# Patient Record
Sex: FEMALE | Race: WHITE | NOT HISPANIC OR LATINO | URBAN - METROPOLITAN AREA
[De-identification: names, ages, dates, MRNs, and addresses within clinical notes are randomized per-mention and may not be internally consistent; named-entity substitution may affect disease eponyms.]

---

## 2017-08-18 ENCOUNTER — OUTPATIENT (OUTPATIENT)
Dept: OUTPATIENT SERVICES | Facility: HOSPITAL | Age: 62
LOS: 1 days | End: 2017-08-18
Payer: COMMERCIAL

## 2017-08-18 ENCOUNTER — TRANSCRIPTION ENCOUNTER (OUTPATIENT)
Age: 62
End: 2017-08-18

## 2017-08-18 DIAGNOSIS — M47.812 SPONDYLOSIS WITHOUT MYELOPATHY OR RADICULOPATHY, CERVICAL REGION: ICD-10-CM

## 2017-08-18 PROCEDURE — 64491 INJ PARAVERT F JNT C/T 2 LEV: CPT | Mod: 50

## 2017-08-18 PROCEDURE — 64490 INJ PARAVERT F JNT C/T 1 LEV: CPT | Mod: 50

## 2017-08-18 PROCEDURE — 64492 INJ PARAVERT F JNT C/T 3 LEV: CPT | Mod: 50

## 2017-08-18 PROCEDURE — 76000 FLUOROSCOPY <1 HR PHYS/QHP: CPT

## 2017-09-01 ENCOUNTER — TRANSCRIPTION ENCOUNTER (OUTPATIENT)
Age: 62
End: 2017-09-01

## 2017-09-01 ENCOUNTER — OUTPATIENT (OUTPATIENT)
Dept: OUTPATIENT SERVICES | Facility: HOSPITAL | Age: 62
LOS: 1 days | End: 2017-09-01
Payer: COMMERCIAL

## 2017-09-01 DIAGNOSIS — M47.816 SPONDYLOSIS WITHOUT MYELOPATHY OR RADICULOPATHY, LUMBAR REGION: ICD-10-CM

## 2017-09-26 ENCOUNTER — OUTPATIENT (OUTPATIENT)
Dept: OUTPATIENT SERVICES | Facility: HOSPITAL | Age: 62
LOS: 1 days | End: 2017-09-26
Payer: COMMERCIAL

## 2017-09-26 DIAGNOSIS — M47.816 SPONDYLOSIS WITHOUT MYELOPATHY OR RADICULOPATHY, LUMBAR REGION: ICD-10-CM

## 2017-09-26 PROCEDURE — 64495 INJ PARAVERT F JNT L/S 3 LEV: CPT | Mod: 50

## 2017-09-26 PROCEDURE — 76000 FLUOROSCOPY <1 HR PHYS/QHP: CPT

## 2017-09-26 PROCEDURE — 64493 INJ PARAVERT F JNT L/S 1 LEV: CPT | Mod: 50

## 2017-09-26 PROCEDURE — 64494 INJ PARAVERT F JNT L/S 2 LEV: CPT | Mod: 50

## 2017-09-27 ENCOUNTER — TRANSCRIPTION ENCOUNTER (OUTPATIENT)
Age: 62
End: 2017-09-27

## 2017-10-03 ENCOUNTER — TRANSCRIPTION ENCOUNTER (OUTPATIENT)
Age: 62
End: 2017-10-03

## 2017-10-03 ENCOUNTER — OUTPATIENT (OUTPATIENT)
Dept: OUTPATIENT SERVICES | Facility: HOSPITAL | Age: 62
LOS: 1 days | End: 2017-10-03
Payer: COMMERCIAL

## 2017-10-03 DIAGNOSIS — M47.812 SPONDYLOSIS WITHOUT MYELOPATHY OR RADICULOPATHY, CERVICAL REGION: ICD-10-CM

## 2017-10-03 PROCEDURE — 76000 FLUOROSCOPY <1 HR PHYS/QHP: CPT

## 2017-10-03 PROCEDURE — 64491 INJ PARAVERT F JNT C/T 2 LEV: CPT | Mod: 50

## 2017-10-03 PROCEDURE — 64492 INJ PARAVERT F JNT C/T 3 LEV: CPT | Mod: 50

## 2017-10-03 PROCEDURE — 64490 INJ PARAVERT F JNT C/T 1 LEV: CPT | Mod: 50

## 2017-12-12 ENCOUNTER — INPATIENT (INPATIENT)
Facility: HOSPITAL | Age: 62
LOS: 1 days | Discharge: HOME | DRG: 494 | End: 2017-12-14
Attending: ORTHOPAEDIC SURGERY | Admitting: ORTHOPAEDIC SURGERY
Payer: COMMERCIAL

## 2017-12-12 VITALS
RESPIRATION RATE: 20 BRPM | TEMPERATURE: 98 F | DIASTOLIC BLOOD PRESSURE: 101 MMHG | OXYGEN SATURATION: 95 % | HEIGHT: 59 IN | WEIGHT: 134.92 LBS | HEART RATE: 115 BPM | SYSTOLIC BLOOD PRESSURE: 179 MMHG

## 2017-12-12 DIAGNOSIS — I10 ESSENTIAL (PRIMARY) HYPERTENSION: ICD-10-CM

## 2017-12-12 DIAGNOSIS — S82.842A DISPLACED BIMALLEOLAR FRACTURE OF LEFT LOWER LEG, INITIAL ENCOUNTER FOR CLOSED FRACTURE: ICD-10-CM

## 2017-12-12 DIAGNOSIS — E66.3 OVERWEIGHT: ICD-10-CM

## 2017-12-12 DIAGNOSIS — F17.210 NICOTINE DEPENDENCE, CIGARETTES, UNCOMPLICATED: ICD-10-CM

## 2017-12-12 DIAGNOSIS — E03.9 HYPOTHYROIDISM, UNSPECIFIED: ICD-10-CM

## 2017-12-12 DIAGNOSIS — Z01.818 ENCOUNTER FOR OTHER PREPROCEDURAL EXAMINATION: ICD-10-CM

## 2017-12-12 DIAGNOSIS — E78.5 HYPERLIPIDEMIA, UNSPECIFIED: ICD-10-CM

## 2017-12-12 DIAGNOSIS — Z29.9 ENCOUNTER FOR PROPHYLACTIC MEASURES, UNSPECIFIED: ICD-10-CM

## 2017-12-12 LAB
ALBUMIN SERPL ELPH-MCNC: 3.9 G/DL — SIGNIFICANT CHANGE UP (ref 3.3–5.2)
ALP SERPL-CCNC: 62 U/L — SIGNIFICANT CHANGE UP (ref 40–120)
ALT FLD-CCNC: 21 U/L — SIGNIFICANT CHANGE UP
ANION GAP SERPL CALC-SCNC: 12 MMOL/L — SIGNIFICANT CHANGE UP (ref 5–17)
AST SERPL-CCNC: 28 U/L — SIGNIFICANT CHANGE UP
BASOPHILS # BLD AUTO: 0.1 K/UL — SIGNIFICANT CHANGE UP (ref 0–0.2)
BASOPHILS NFR BLD AUTO: 0.7 % — SIGNIFICANT CHANGE UP (ref 0–2)
BILIRUB SERPL-MCNC: 0.4 MG/DL — SIGNIFICANT CHANGE UP (ref 0.4–2)
BLD GP AB SCN SERPL QL: SIGNIFICANT CHANGE UP
BUN SERPL-MCNC: 12 MG/DL — SIGNIFICANT CHANGE UP (ref 8–20)
CALCIUM SERPL-MCNC: 9.2 MG/DL — SIGNIFICANT CHANGE UP (ref 8.6–10.2)
CHLORIDE SERPL-SCNC: 104 MMOL/L — SIGNIFICANT CHANGE UP (ref 98–107)
CO2 SERPL-SCNC: 25 MMOL/L — SIGNIFICANT CHANGE UP (ref 22–29)
CREAT SERPL-MCNC: 0.66 MG/DL — SIGNIFICANT CHANGE UP (ref 0.5–1.3)
EOSINOPHIL # BLD AUTO: 0.2 K/UL — SIGNIFICANT CHANGE UP (ref 0–0.5)
EOSINOPHIL NFR BLD AUTO: 2.8 % — SIGNIFICANT CHANGE UP (ref 0–6)
GLUCOSE SERPL-MCNC: 98 MG/DL — SIGNIFICANT CHANGE UP (ref 70–115)
HCT VFR BLD CALC: 43.1 % — SIGNIFICANT CHANGE UP (ref 37–47)
HGB BLD-MCNC: 14.6 G/DL — SIGNIFICANT CHANGE UP (ref 12–16)
INR BLD: 1.07 RATIO — SIGNIFICANT CHANGE UP (ref 0.88–1.16)
LYMPHOCYTES # BLD AUTO: 3.8 K/UL — SIGNIFICANT CHANGE UP (ref 1–4.8)
LYMPHOCYTES # BLD AUTO: 44.9 % — SIGNIFICANT CHANGE UP (ref 20–55)
MCHC RBC-ENTMCNC: 29.4 PG — SIGNIFICANT CHANGE UP (ref 27–31)
MCHC RBC-ENTMCNC: 33.9 G/DL — SIGNIFICANT CHANGE UP (ref 32–36)
MCV RBC AUTO: 86.9 FL — SIGNIFICANT CHANGE UP (ref 81–99)
MONOCYTES # BLD AUTO: 0.6 K/UL — SIGNIFICANT CHANGE UP (ref 0–0.8)
MONOCYTES NFR BLD AUTO: 7.7 % — SIGNIFICANT CHANGE UP (ref 3–10)
NEUTROPHILS # BLD AUTO: 3.7 K/UL — SIGNIFICANT CHANGE UP (ref 1.8–8)
NEUTROPHILS NFR BLD AUTO: 43.7 % — SIGNIFICANT CHANGE UP (ref 37–73)
PLATELET # BLD AUTO: 272 K/UL — SIGNIFICANT CHANGE UP (ref 150–400)
POTASSIUM SERPL-MCNC: 4.7 MMOL/L — SIGNIFICANT CHANGE UP (ref 3.5–5.3)
POTASSIUM SERPL-SCNC: 4.7 MMOL/L — SIGNIFICANT CHANGE UP (ref 3.5–5.3)
PROT SERPL-MCNC: 7.1 G/DL — SIGNIFICANT CHANGE UP (ref 6.6–8.7)
PROTHROM AB SERPL-ACNC: 11.8 SEC — SIGNIFICANT CHANGE UP (ref 9.8–12.7)
RBC # BLD: 4.96 M/UL — SIGNIFICANT CHANGE UP (ref 4.4–5.2)
RBC # FLD: 13.9 % — SIGNIFICANT CHANGE UP (ref 11–15.6)
SODIUM SERPL-SCNC: 141 MMOL/L — SIGNIFICANT CHANGE UP (ref 135–145)
T4 AB SER-ACNC: 11 UG/DL — SIGNIFICANT CHANGE UP (ref 4.5–12)
TSH SERPL-MCNC: 0.63 UIU/ML — SIGNIFICANT CHANGE UP (ref 0.27–4.2)
TYPE + AB SCN PNL BLD: SIGNIFICANT CHANGE UP
WBC # BLD: 8.5 K/UL — SIGNIFICANT CHANGE UP (ref 4.8–10.8)
WBC # FLD AUTO: 8.5 K/UL — SIGNIFICANT CHANGE UP (ref 4.8–10.8)

## 2017-12-12 PROCEDURE — 73590 X-RAY EXAM OF LOWER LEG: CPT | Mod: 26,LT

## 2017-12-12 PROCEDURE — 93010 ELECTROCARDIOGRAM REPORT: CPT

## 2017-12-12 PROCEDURE — 99223 1ST HOSP IP/OBS HIGH 75: CPT | Mod: 57

## 2017-12-12 PROCEDURE — 27822 TREATMENT OF ANKLE FRACTURE: CPT | Mod: AS,LT

## 2017-12-12 PROCEDURE — 99223 1ST HOSP IP/OBS HIGH 75: CPT | Mod: 25

## 2017-12-12 PROCEDURE — 73610 X-RAY EXAM OF ANKLE: CPT | Mod: 26,76,LT

## 2017-12-12 PROCEDURE — 12345: CPT | Mod: NC

## 2017-12-12 PROCEDURE — 99406 BEHAV CHNG SMOKING 3-10 MIN: CPT

## 2017-12-12 PROCEDURE — 73610 X-RAY EXAM OF ANKLE: CPT | Mod: 26,77,LT

## 2017-12-12 PROCEDURE — 27822 TREATMENT OF ANKLE FRACTURE: CPT | Mod: LT

## 2017-12-12 PROCEDURE — 76000 FLUOROSCOPY <1 HR PHYS/QHP: CPT | Mod: 26

## 2017-12-12 PROCEDURE — 99285 EMERGENCY DEPT VISIT HI MDM: CPT | Mod: 25

## 2017-12-12 PROCEDURE — 71010: CPT | Mod: 26

## 2017-12-12 RX ORDER — SODIUM CHLORIDE 9 MG/ML
1000 INJECTION INTRAMUSCULAR; INTRAVENOUS; SUBCUTANEOUS
Qty: 0 | Refills: 0 | Status: DISCONTINUED | OUTPATIENT
Start: 2017-12-12 | End: 2017-12-12

## 2017-12-12 RX ORDER — HYDROMORPHONE HYDROCHLORIDE 2 MG/ML
0.5 INJECTION INTRAMUSCULAR; INTRAVENOUS; SUBCUTANEOUS EVERY 6 HOURS
Qty: 0 | Refills: 0 | Status: DISCONTINUED | OUTPATIENT
Start: 2017-12-12 | End: 2017-12-13

## 2017-12-12 RX ORDER — LEVOTHYROXINE SODIUM 125 MCG
100 TABLET ORAL DAILY
Qty: 0 | Refills: 0 | Status: DISCONTINUED | OUTPATIENT
Start: 2017-12-12 | End: 2017-12-12

## 2017-12-12 RX ORDER — LISINOPRIL 2.5 MG/1
10 TABLET ORAL DAILY
Qty: 0 | Refills: 0 | Status: DISCONTINUED | OUTPATIENT
Start: 2017-12-12 | End: 2017-12-12

## 2017-12-12 RX ORDER — SODIUM CHLORIDE 9 MG/ML
1000 INJECTION, SOLUTION INTRAVENOUS
Qty: 0 | Refills: 0 | Status: DISCONTINUED | OUTPATIENT
Start: 2017-12-12 | End: 2017-12-13

## 2017-12-12 RX ORDER — SENNA PLUS 8.6 MG/1
2 TABLET ORAL AT BEDTIME
Qty: 0 | Refills: 0 | Status: DISCONTINUED | OUTPATIENT
Start: 2017-12-12 | End: 2017-12-12

## 2017-12-12 RX ORDER — LEVOTHYROXINE SODIUM 125 MCG
112 TABLET ORAL DAILY
Qty: 0 | Refills: 0 | Status: DISCONTINUED | OUTPATIENT
Start: 2017-12-12 | End: 2017-12-14

## 2017-12-12 RX ORDER — LORATADINE 10 MG/1
10 TABLET ORAL DAILY
Qty: 0 | Refills: 0 | Status: DISCONTINUED | OUTPATIENT
Start: 2017-12-12 | End: 2017-12-12

## 2017-12-12 RX ORDER — ATORVASTATIN CALCIUM 80 MG/1
1 TABLET, FILM COATED ORAL
Qty: 0 | Refills: 0 | COMMUNITY

## 2017-12-12 RX ORDER — CEFAZOLIN SODIUM 1 G
2000 VIAL (EA) INJECTION ONCE
Qty: 0 | Refills: 0 | Status: COMPLETED | OUTPATIENT
Start: 2017-12-12 | End: 2017-12-12

## 2017-12-12 RX ORDER — LEVOTHYROXINE SODIUM 125 MCG
1 TABLET ORAL
Qty: 0 | Refills: 0 | COMMUNITY

## 2017-12-12 RX ORDER — HYDROMORPHONE HYDROCHLORIDE 2 MG/ML
0.5 INJECTION INTRAMUSCULAR; INTRAVENOUS; SUBCUTANEOUS
Qty: 0 | Refills: 0 | Status: DISCONTINUED | OUTPATIENT
Start: 2017-12-12 | End: 2017-12-12

## 2017-12-12 RX ORDER — SODIUM CHLORIDE 9 MG/ML
1000 INJECTION, SOLUTION INTRAVENOUS
Qty: 0 | Refills: 0 | Status: DISCONTINUED | OUTPATIENT
Start: 2017-12-12 | End: 2017-12-12

## 2017-12-12 RX ORDER — ONDANSETRON 8 MG/1
4 TABLET, FILM COATED ORAL ONCE
Qty: 0 | Refills: 0 | Status: DISCONTINUED | OUTPATIENT
Start: 2017-12-12 | End: 2017-12-12

## 2017-12-12 RX ORDER — CYCLOBENZAPRINE HYDROCHLORIDE 10 MG/1
10 TABLET, FILM COATED ORAL THREE TIMES A DAY
Qty: 0 | Refills: 0 | Status: DISCONTINUED | OUTPATIENT
Start: 2017-12-12 | End: 2017-12-14

## 2017-12-12 RX ORDER — HYDROMORPHONE HYDROCHLORIDE 2 MG/ML
1 INJECTION INTRAMUSCULAR; INTRAVENOUS; SUBCUTANEOUS
Qty: 0 | Refills: 0 | Status: DISCONTINUED | OUTPATIENT
Start: 2017-12-12 | End: 2017-12-12

## 2017-12-12 RX ORDER — OXYCODONE HYDROCHLORIDE 5 MG/1
1 TABLET ORAL
Qty: 0 | Refills: 0 | COMMUNITY

## 2017-12-12 RX ORDER — LISINOPRIL 2.5 MG/1
1 TABLET ORAL
Qty: 0 | Refills: 0 | COMMUNITY

## 2017-12-12 RX ORDER — AMITRIPTYLINE HCL 25 MG
150 TABLET ORAL AT BEDTIME
Qty: 0 | Refills: 0 | Status: DISCONTINUED | OUTPATIENT
Start: 2017-12-12 | End: 2017-12-14

## 2017-12-12 RX ORDER — FENTANYL CITRATE 50 UG/ML
50 INJECTION INTRAVENOUS
Qty: 0 | Refills: 0 | Status: DISCONTINUED | OUTPATIENT
Start: 2017-12-12 | End: 2017-12-12

## 2017-12-12 RX ORDER — DESLORATADINE 5 MG/1
1 TABLET, FILM COATED ORAL
Qty: 0 | Refills: 0 | COMMUNITY

## 2017-12-12 RX ORDER — ATORVASTATIN CALCIUM 80 MG/1
40 TABLET, FILM COATED ORAL AT BEDTIME
Qty: 0 | Refills: 0 | Status: DISCONTINUED | OUTPATIENT
Start: 2017-12-12 | End: 2017-12-12

## 2017-12-12 RX ORDER — CYCLOBENZAPRINE HYDROCHLORIDE 10 MG/1
1 TABLET, FILM COATED ORAL
Qty: 0 | Refills: 0 | COMMUNITY

## 2017-12-12 RX ORDER — ENOXAPARIN SODIUM 100 MG/ML
40 INJECTION SUBCUTANEOUS EVERY 24 HOURS
Qty: 0 | Refills: 0 | Status: DISCONTINUED | OUTPATIENT
Start: 2017-12-13 | End: 2017-12-14

## 2017-12-12 RX ORDER — HYDROMORPHONE HYDROCHLORIDE 2 MG/ML
2 INJECTION INTRAMUSCULAR; INTRAVENOUS; SUBCUTANEOUS EVERY 4 HOURS
Qty: 0 | Refills: 0 | Status: DISCONTINUED | OUTPATIENT
Start: 2017-12-12 | End: 2017-12-14

## 2017-12-12 RX ORDER — LIOTHYRONINE SODIUM 25 UG/1
5 TABLET ORAL DAILY
Qty: 0 | Refills: 0 | Status: DISCONTINUED | OUTPATIENT
Start: 2017-12-12 | End: 2017-12-14

## 2017-12-12 RX ORDER — LIOTHYRONINE SODIUM 25 UG/1
5 TABLET ORAL DAILY
Qty: 0 | Refills: 0 | Status: DISCONTINUED | OUTPATIENT
Start: 2017-12-12 | End: 2017-12-12

## 2017-12-12 RX ORDER — AMITRIPTYLINE HCL 25 MG
150 TABLET ORAL AT BEDTIME
Qty: 0 | Refills: 0 | Status: DISCONTINUED | OUTPATIENT
Start: 2017-12-12 | End: 2017-12-12

## 2017-12-12 RX ORDER — AMITRIPTYLINE HCL 25 MG
1 TABLET ORAL
Qty: 0 | Refills: 0 | COMMUNITY

## 2017-12-12 RX ORDER — HYDROMORPHONE HYDROCHLORIDE 2 MG/ML
1 INJECTION INTRAMUSCULAR; INTRAVENOUS; SUBCUTANEOUS ONCE
Qty: 0 | Refills: 0 | Status: DISCONTINUED | OUTPATIENT
Start: 2017-12-12 | End: 2017-12-12

## 2017-12-12 RX ORDER — ATORVASTATIN CALCIUM 80 MG/1
40 TABLET, FILM COATED ORAL AT BEDTIME
Qty: 0 | Refills: 0 | Status: DISCONTINUED | OUTPATIENT
Start: 2017-12-12 | End: 2017-12-14

## 2017-12-12 RX ORDER — ACETAMINOPHEN 500 MG
650 TABLET ORAL EVERY 6 HOURS
Qty: 0 | Refills: 0 | Status: DISCONTINUED | OUTPATIENT
Start: 2017-12-12 | End: 2017-12-13

## 2017-12-12 RX ORDER — INFLUENZA VIRUS VACCINE 15; 15; 15; 15 UG/.5ML; UG/.5ML; UG/.5ML; UG/.5ML
0.5 SUSPENSION INTRAMUSCULAR ONCE
Qty: 0 | Refills: 0 | Status: COMPLETED | OUTPATIENT
Start: 2017-12-12 | End: 2017-12-12

## 2017-12-12 RX ORDER — HYDRALAZINE HCL 50 MG
10 TABLET ORAL EVERY 6 HOURS
Qty: 0 | Refills: 0 | Status: DISCONTINUED | OUTPATIENT
Start: 2017-12-12 | End: 2017-12-12

## 2017-12-12 RX ORDER — MAGNESIUM HYDROXIDE 400 MG/1
30 TABLET, CHEWABLE ORAL DAILY
Qty: 0 | Refills: 0 | Status: DISCONTINUED | OUTPATIENT
Start: 2017-12-12 | End: 2017-12-14

## 2017-12-12 RX ORDER — HYDROMORPHONE HYDROCHLORIDE 2 MG/ML
0.5 INJECTION INTRAMUSCULAR; INTRAVENOUS; SUBCUTANEOUS
Qty: 0 | Refills: 0 | Status: DISCONTINUED | OUTPATIENT
Start: 2017-12-12 | End: 2017-12-13

## 2017-12-12 RX ORDER — OXYCODONE HYDROCHLORIDE 5 MG/1
10 TABLET ORAL EVERY 4 HOURS
Qty: 0 | Refills: 0 | Status: DISCONTINUED | OUTPATIENT
Start: 2017-12-12 | End: 2017-12-13

## 2017-12-12 RX ORDER — LORATADINE 10 MG/1
10 TABLET ORAL DAILY
Qty: 0 | Refills: 0 | Status: DISCONTINUED | OUTPATIENT
Start: 2017-12-12 | End: 2017-12-14

## 2017-12-12 RX ORDER — MORPHINE SULFATE 50 MG/1
4 CAPSULE, EXTENDED RELEASE ORAL
Qty: 0 | Refills: 0 | Status: DISCONTINUED | OUTPATIENT
Start: 2017-12-12 | End: 2017-12-12

## 2017-12-12 RX ORDER — ONDANSETRON 8 MG/1
4 TABLET, FILM COATED ORAL EVERY 6 HOURS
Qty: 0 | Refills: 0 | Status: DISCONTINUED | OUTPATIENT
Start: 2017-12-12 | End: 2017-12-14

## 2017-12-12 RX ORDER — DOCUSATE SODIUM 100 MG
100 CAPSULE ORAL THREE TIMES A DAY
Qty: 0 | Refills: 0 | Status: DISCONTINUED | OUTPATIENT
Start: 2017-12-12 | End: 2017-12-14

## 2017-12-12 RX ORDER — LISINOPRIL 2.5 MG/1
10 TABLET ORAL DAILY
Qty: 0 | Refills: 0 | Status: DISCONTINUED | OUTPATIENT
Start: 2017-12-12 | End: 2017-12-14

## 2017-12-12 RX ORDER — IPRATROPIUM/ALBUTEROL SULFATE 18-103MCG
3 AEROSOL WITH ADAPTER (GRAM) INHALATION ONCE
Qty: 0 | Refills: 0 | Status: DISCONTINUED | OUTPATIENT
Start: 2017-12-12 | End: 2017-12-12

## 2017-12-12 RX ORDER — HYDROMORPHONE HYDROCHLORIDE 2 MG/ML
0.5 INJECTION INTRAMUSCULAR; INTRAVENOUS; SUBCUTANEOUS EVERY 6 HOURS
Qty: 0 | Refills: 0 | Status: DISCONTINUED | OUTPATIENT
Start: 2017-12-12 | End: 2017-12-12

## 2017-12-12 RX ORDER — LIOTHYRONINE SODIUM 25 UG/1
1 TABLET ORAL
Qty: 0 | Refills: 0 | COMMUNITY

## 2017-12-12 RX ORDER — OXYCODONE HYDROCHLORIDE 5 MG/1
5 TABLET ORAL EVERY 4 HOURS
Qty: 0 | Refills: 0 | Status: DISCONTINUED | OUTPATIENT
Start: 2017-12-12 | End: 2017-12-13

## 2017-12-12 RX ORDER — CEFAZOLIN SODIUM 1 G
2000 VIAL (EA) INJECTION ONCE
Qty: 0 | Refills: 0 | Status: DISCONTINUED | OUTPATIENT
Start: 2017-12-12 | End: 2017-12-12

## 2017-12-12 RX ORDER — DOCUSATE SODIUM 100 MG
100 CAPSULE ORAL THREE TIMES A DAY
Qty: 0 | Refills: 0 | Status: DISCONTINUED | OUTPATIENT
Start: 2017-12-12 | End: 2017-12-12

## 2017-12-12 RX ADMIN — FENTANYL CITRATE 50 MICROGRAM(S): 50 INJECTION INTRAVENOUS at 18:15

## 2017-12-12 RX ADMIN — HYDROMORPHONE HYDROCHLORIDE 1 MILLIGRAM(S): 2 INJECTION INTRAMUSCULAR; INTRAVENOUS; SUBCUTANEOUS at 03:06

## 2017-12-12 RX ADMIN — HYDROMORPHONE HYDROCHLORIDE 1 MILLIGRAM(S): 2 INJECTION INTRAMUSCULAR; INTRAVENOUS; SUBCUTANEOUS at 03:21

## 2017-12-12 RX ADMIN — FENTANYL CITRATE 50 MICROGRAM(S): 50 INJECTION INTRAVENOUS at 18:18

## 2017-12-12 RX ADMIN — Medication 100 MILLIGRAM(S): at 23:49

## 2017-12-12 RX ADMIN — FENTANYL CITRATE 50 MICROGRAM(S): 50 INJECTION INTRAVENOUS at 18:57

## 2017-12-12 RX ADMIN — HYDROMORPHONE HYDROCHLORIDE 0.5 MILLIGRAM(S): 2 INJECTION INTRAMUSCULAR; INTRAVENOUS; SUBCUTANEOUS at 20:26

## 2017-12-12 RX ADMIN — MORPHINE SULFATE 4 MILLIGRAM(S): 50 CAPSULE, EXTENDED RELEASE ORAL at 08:55

## 2017-12-12 RX ADMIN — ATORVASTATIN CALCIUM 40 MILLIGRAM(S): 80 TABLET, FILM COATED ORAL at 23:49

## 2017-12-12 RX ADMIN — FENTANYL CITRATE 50 MICROGRAM(S): 50 INJECTION INTRAVENOUS at 18:28

## 2017-12-12 RX ADMIN — MORPHINE SULFATE 4 MILLIGRAM(S): 50 CAPSULE, EXTENDED RELEASE ORAL at 12:20

## 2017-12-12 RX ADMIN — MORPHINE SULFATE 4 MILLIGRAM(S): 50 CAPSULE, EXTENDED RELEASE ORAL at 13:26

## 2017-12-12 RX ADMIN — CYCLOBENZAPRINE HYDROCHLORIDE 10 MILLIGRAM(S): 10 TABLET, FILM COATED ORAL at 23:49

## 2017-12-12 RX ADMIN — FENTANYL CITRATE 50 MICROGRAM(S): 50 INJECTION INTRAVENOUS at 18:05

## 2017-12-12 RX ADMIN — HYDROMORPHONE HYDROCHLORIDE 2 MILLIGRAM(S): 2 INJECTION INTRAMUSCULAR; INTRAVENOUS; SUBCUTANEOUS at 20:12

## 2017-12-12 RX ADMIN — HYDROMORPHONE HYDROCHLORIDE 0.5 MILLIGRAM(S): 2 INJECTION INTRAMUSCULAR; INTRAVENOUS; SUBCUTANEOUS at 21:55

## 2017-12-12 RX ADMIN — HYDROMORPHONE HYDROCHLORIDE 1 MILLIGRAM(S): 2 INJECTION INTRAMUSCULAR; INTRAVENOUS; SUBCUTANEOUS at 05:05

## 2017-12-12 RX ADMIN — HYDROMORPHONE HYDROCHLORIDE 1 MILLIGRAM(S): 2 INJECTION INTRAMUSCULAR; INTRAVENOUS; SUBCUTANEOUS at 04:50

## 2017-12-12 RX ADMIN — FENTANYL CITRATE 50 MICROGRAM(S): 50 INJECTION INTRAVENOUS at 18:11

## 2017-12-12 RX ADMIN — Medication 650 MILLIGRAM(S): at 22:18

## 2017-12-12 RX ADMIN — HYDROMORPHONE HYDROCHLORIDE 2 MILLIGRAM(S): 2 INJECTION INTRAMUSCULAR; INTRAVENOUS; SUBCUTANEOUS at 19:33

## 2017-12-12 RX ADMIN — MORPHINE SULFATE 4 MILLIGRAM(S): 50 CAPSULE, EXTENDED RELEASE ORAL at 09:23

## 2017-12-12 RX ADMIN — HYDROMORPHONE HYDROCHLORIDE 0.5 MILLIGRAM(S): 2 INJECTION INTRAMUSCULAR; INTRAVENOUS; SUBCUTANEOUS at 21:31

## 2017-12-12 RX ADMIN — OXYCODONE HYDROCHLORIDE 10 MILLIGRAM(S): 5 TABLET ORAL at 22:19

## 2017-12-12 RX ADMIN — SODIUM CHLORIDE 125 MILLILITER(S): 9 INJECTION INTRAMUSCULAR; INTRAVENOUS; SUBCUTANEOUS at 08:55

## 2017-12-12 RX ADMIN — Medication 100 MILLIGRAM(S): at 19:31

## 2017-12-12 NOTE — BRIEF OPERATIVE NOTE - PRE-OP DX
Closed fracture of left ankle, initial encounter  12/12/2017  trimal ankle fracture/dislocation  Active  Satish Barone

## 2017-12-12 NOTE — H&P ADULT - PROBLEM SELECTOR PLAN 1
1. Admit to orthopedics   2. NPO for OR  3. Pain Control  4. NWB Left leg   5. Medical clearance for OR

## 2017-12-12 NOTE — H&P ADULT - NSHPREVIEWOFSYSTEMS_GEN_ALL_CORE
General No fevers, no chills  Resp No labored breathing   Cardiac no chest pain   ABD soft NT  Musculoskeletal Positive ankle deformity   Neuro no numbness, no weakness  Skin no rash

## 2017-12-12 NOTE — BRIEF OPERATIVE NOTE - PROCEDURE
<<-----Click on this checkbox to enter Procedure Ankle fracture surgery  12/12/2017  ORIF left trimal ankle fracture. Synthes posterolateral 4 hole fibula plate with 4 distal and 4 proximal screws, one medial mal screw  Active  MLINN

## 2017-12-12 NOTE — CONSULT NOTE ADULT - PROBLEM SELECTOR RECOMMENDATION 8
DVT ppx w/ ICD's recommended. will need lovenox post-op. I discussed early ambulation in post-operative period and discussed importance of ambulation in recovery period to minimize risk of VTE

## 2017-12-12 NOTE — ED PROVIDER NOTE - OBJECTIVE STATEMENT
This is a 61 y/o F presents to ED c/o L ankle pain with obvious deformity. Pt reports she was on her deck, a cat jumped out and as she attempted to not step on it she twisted wrong, hurt her ankle and fell. On 20mg morphine QID for chronic pain. Allergic to ibuprofen. Denies head trauma, LOC, nausea, vomiting, fever, abd pain, numbness, tingling or any other complaints at this time.  PCP: Dr. Lombardi

## 2017-12-12 NOTE — CONSULT NOTE ADULT - ASSESSMENT
63yo F w/ PMHx chronic low back pain, chronic pain syndrome, hypothyroidism, HTN and current smoker who presents s/p mechanical fall and sustained L bimalleolar ankle fracture. Orthopedics requested medical consult for jorge-operative risk assessment.

## 2017-12-12 NOTE — ED ADULT NURSE NOTE - OBJECTIVE STATEMENT
Pt received in [facility] s/p slip and fall over mat on deck after being spooked by a cat. Pt rolled her left ankle and presents today with obvious deformity to b/l ankle with beginning stages of ecchymosis. +pedal pulses b/l. Pt with hx of HTN and hashimoto's.

## 2017-12-12 NOTE — CONSULT NOTE ADULT - PROBLEM SELECTOR RECOMMENDATION 3
labs indicate pt's dosing may be elevated  mildly reduce synthroid dose today, recommend f/u endocrinologist as outpatient for adjustment of thyroid supplementing therapy  pt has h/o Hashimoto's and was hyperthyroid initially (>25yrs ago) but for 15 years has been taking the regimen listed

## 2017-12-12 NOTE — H&P ADULT - NSHPPHYSICALEXAM_GEN_ALL_CORE
General WD, WN female in NAD  ENT Mucus Membranes Moist   Neck FROM  Resp no labored breathing   Left ankle positive deformity, positive tenderness throughout ankle, no tenderness to proximal leg or foot, sensation intact to touch, FROM to toes   Skin No visible rashes

## 2017-12-12 NOTE — CONSULT NOTE ADULT - PROBLEM SELECTOR RECOMMENDATION 9
admitted to ortho service  pain control per primary team  surgical plan per primary team  DVT prophylaxis once surgery team agrees in post-op period

## 2017-12-12 NOTE — ED PROVIDER NOTE - PROGRESS NOTE DETAILS
Spoke to ortho about the pt's distal Tib/fib fx.  I Theresa Pretty attest that this documentation has been prepared under the direction and in the presence of Mitul Estrada MD. Patient to be admitted to Dr. Pablo service and medicine to consult

## 2017-12-12 NOTE — H&P ADULT - ASSESSMENT
FRACTURE REDUCTION  PROCEDURE NOTE: Fracture reduction     Performed by:  Jaxson Ware PA-C    Indication: Acute fracture with displacement, requiring fracture reduction.    Consent: The risks and benefits of the procedure including incomplete reduction, nerve damage and bleeding were explained and the patient verbalized their understanding and wished to proceed with the procedure. Written consent was obtained following the discussion.    Universal Protocol: a time out was performed and the correct patient and site were verified     Procedure: Neurovascular exam intact prior to fracture reduction.  Skin exam : No bleeding or lacerations at the fracture site. Reduction of the Left ankle was accomplished via axial traction and careful manipulation. Following adequate reduction and alignment of the fractured bone, the fracture was immobilized with a  plaster U and posterior splint. Distally, the extremity was neurovascular intact following the procedure.  The patient tolerated the procedure well.    Post reduction films obtained and demonstrated an adequate reduction.    Complications: None

## 2017-12-12 NOTE — PROGRESS NOTE ADULT - SUBJECTIVE AND OBJECTIVE BOX
Patient is a 62y old  Female who presents with a chief complaint of Left ankle Bimall fx    HPI:  Patient is a 62 year old female who presented to [facility] ER c/o Left ankle deformity and pain s/p fall. patient states at approx 1 am she tripped on deck after being frightened by a cat causing injury to ankle.  Patient was seen by ER staff and xrays revealed positive left ankle bimall fx with dislocation of joint.  Orthopedic consult was called and patient had closed reduction performed in ER with splint application.  Patient was  admitted for possible  OR after medical optimization       Allergies    Lyrica (Hives; Chills)  Motrin (Rash; Urticaria; Hives)  NSAIDs (Rash; Urticaria; Hives)    Intolerances        REVIEW OF SYSTEMS:    ankle pain , no other complaints     Vital Signs Last 24 Hrs  T(C): 36.4 (12 Dec 2017 08:50), Max: 36.9 (12 Dec 2017 02:33)  T(F): 97.5 (12 Dec 2017 08:50), Max: 98.4 (12 Dec 2017 02:33)  HR: 85 (12 Dec 2017 08:50) (85 - 115)  BP: 145/78 (12 Dec 2017 08:50) (145/78 - 179/101)  BP(mean): --  RR: 20 (12 Dec 2017 02:33) (20 - 20)  SpO2: 95% (12 Dec 2017 02:33) (95% - 95%)    PHYSICAL EXAM:    GENERAL: NAD, well-groomed, well-developed  HEAD:  Atraumatic, Normocephalic  NECK: Supple, No JVD, Normal thyroid  CHEST/LUNG: Clear to percussion bilaterally; No rales, rhonchi, wheezing, or rubs  HEART: Regular rate and rhythm; No murmurs, rubs, or gallops  ABDOMEN: Soft, Nontender, Nondistended; Bowel sounds present  EXTREMITIES:  2+ Peripheral Pulses, No clubbing, cyanosis, or edema      LABS:                        14.6   8.5   )-----------( 272      ( 12 Dec 2017 03:26 )             43.1     12-12    141  |  104  |  12.0  ----------------------------<  98  4.7   |  25.0  |  0.66    Ca    9.2      12 Dec 2017 03:26    TPro  7.1  /  Alb  3.9  /  TBili  0.4  /  DBili  x   /  AST  28  /  ALT  21  /  AlkPhos  62  12-12    PT/INR - ( 12 Dec 2017 03:26 )   PT: 11.8 sec;   INR: 1.07 ratio               RADIOLOGY & ADDITIONAL TESTS:

## 2017-12-12 NOTE — H&P ADULT - HISTORY OF PRESENT ILLNESS
Patient is a 62 year old female who presented to [facility] ER c/o Left ankle deformity and pain s/p fall. patient states at approx 1 am she tripped on deck after being frightened by a cat causing injury to ankle.  Patient was seen by ER staff and xrays revealed positive left ankle bimall fx with dislocation of joint.  Orthopedic consult was called and patient had closed reduction performed in ER with splint application.  Patient to be admitted for OR after medical optimization

## 2017-12-12 NOTE — CONSULT NOTE ADULT - PROBLEM SELECTOR RECOMMENDATION 2
RCRI score of 0  pt had stress testing and cardiac cath 11/2015 and work up was unremarkable  cardiology: Dr. Grubbs  pt may proceed w/ surgical procedure w/ low risk for low to intermediate risk procedure

## 2017-12-12 NOTE — CONSULT NOTE ADULT - PROBLEM SELECTOR RECOMMENDATION 7
6 minutes spent discussing the risks associated w/ smoking including increased risk clinically significant cardiovascular disease, debility, death. pt and spouse agree w/ the conseling provided and demonstrated adequate understanding of counseling provided. nicotine patch offered and declined

## 2017-12-12 NOTE — SBIRT NOTE. - NSSBIRTSERVICES_GEN_A_ED_FT
Provided SBIRT services: Full screen Negative. Positive reinforcement provided given patient currently within healthy guidelines. Education materials reviewed and given to patient.  Audit Score: 0  DAST Score: 0  Duration = 5 Minutes

## 2017-12-12 NOTE — CONSULT NOTE ADULT - SUBJECTIVE AND OBJECTIVE BOX
PCP: Lombardi    C/c: L ankle pain s/p mechanical fall    63yo F w/ PMHx chronic low back pain, chronic pain syndrome, hypothyroidism, HTN and current smoker who presents s/p mechanical fall and sustained L bimalleolar ankle fracture. Orthopedics requested medical consult for jorge-operative risk assessment.     PMHx: chronic low back pain, chronic pain syndrome, hypothyroidism, HTN and current smoker  Allergies: lyrica, motrin, NSAID's  PSHx:     Home Medications:  amitriptyline 150 mg oral tablet: 1 tab(s) orally once a day (at bedtime) (12 Dec 2017 03:22)  atorvastatin 40 mg oral tablet: 1 tab(s) orally once a day (12 Dec 2017 03:22)  cyclobenzaprine 10 mg oral tablet: 1 tab(s) orally 3 times a day (12 Dec 2017 03:22)  desloratadine 5 mg oral tablet: 1 tab(s) orally once a day (12 Dec 2017 03:22)  levothyroxine 112 mcg (0.112 mg) oral tablet: 1 tab(s) orally once a day (12 Dec 2017 03:22)  liothyronine 5 mcg oral tablet: 1 tab(s) orally once a day (12 Dec 2017 03:22)  lisinopril 10 mg oral tablet: 1 tab(s) orally once a day (12 Dec 2017 03:22)  oxyCODONE 20 mg oral tablet: 1 tab(s) orally every 6 hours (12 Dec 2017 03:22)    Family Hx:   mother and father:    Social Hx:   current smoker  etoh:  recreational drug use:    ROS:   CONSTITUTIONAL: denies fever, chills, fatigue, weakness  HEENT: denies blurred vision, sore throat  SKIN: denies new lesions, rash  CARDIOVASCULAR: denies chest pain, chest pressure, palpitations  RESPIRATORY: denies shortness of breath, sputum production  GASTROINTESTINAL: denies nausea, vomiting, diarrhea, abdominal pain  GENITOURINARY: denies dysuria, discharge  NEUROLOGICAL: denies numbness, headache, focal weakness  MUSCULOSKELETAL: denies new joint pain, muscle aches  HEMATOLOGIC: denies gross bleeding, bruising  LYMPHATICS: denies enlarged lymph nodes, extremity swelling  PSYCHIATRIC: denies recent changes in anxiety, depression  ENDOCRINOLOGIC: denies sweating, cold or heat intolerance PCP: Lombardi    C/c: L ankle pain s/p mechanical fall    HPI: 61yo F w/ PMHx chronic low back pain, chronic pain syndrome, hypothyroidism, HTN and current smoker who presents s/p mechanical fall and sustained L bimalleolar ankle fracture. Orthopedics requested medical consult for jorge-operative risk assessment.     PMHx: chronic low back pain, chronic pain syndrome, hypothyroidism, HTN and current smoker  Allergies: lyrica, motrin, NSAID's  PSHx:     Home Medications:  amitriptyline 150 mg oral tablet: 1 tab(s) orally once a day (at bedtime) (12 Dec 2017 03:22)  atorvastatin 40 mg oral tablet: 1 tab(s) orally once a day (12 Dec 2017 03:22)  cyclobenzaprine 10 mg oral tablet: 1 tab(s) orally 3 times a day (12 Dec 2017 03:22)  desloratadine 5 mg oral tablet: 1 tab(s) orally once a day (12 Dec 2017 03:22)  levothyroxine 112 mcg (0.112 mg) oral tablet: 1 tab(s) orally once a day (12 Dec 2017 03:22)  liothyronine 5 mcg oral tablet: 1 tab(s) orally once a day (12 Dec 2017 03:22)  lisinopril 10 mg oral tablet: 1 tab(s) orally once a day (12 Dec 2017 03:22)  oxyCODONE 20 mg oral tablet: 1 tab(s) orally every 6 hours (12 Dec 2017 03:22)    Family Hx:   mother and father:    Social Hx:   current smoker  etoh:  recreational drug use:    ROS:   CONSTITUTIONAL: denies fever, chills, fatigue, weakness  HEENT: denies blurred vision, sore throat  SKIN: denies new lesions, rash  CARDIOVASCULAR: denies chest pain, chest pressure, palpitations  RESPIRATORY: denies shortness of breath, sputum production  GASTROINTESTINAL: denies nausea, vomiting, diarrhea, abdominal pain  GENITOURINARY: denies dysuria, discharge  NEUROLOGICAL: denies numbness, headache, focal weakness  MUSCULOSKELETAL: as per hpi  HEMATOLOGIC: denies gross bleeding, bruising  LYMPHATICS: denies enlarged lymph nodes, extremity swelling  PSYCHIATRIC: denies recent changes in anxiety, depression  ENDOCRINOLOGIC: denies sweating, cold or heat intolerance    Labs:                     14.6   8.5   )-----------( 272      ( 12 Dec 2017 03:26 )             43.1     12-12    141  |  104  |  12.0  ----------------------------<  98  4.7   |  25.0  |  0.66    Ca    9.2      12 Dec 2017 03:26    TPro  7.1  /  Alb  3.9  /  TBili  0.4  /  DBili  x   /  AST  28  /  ALT  21  /  AlkPhos  62  12-12    PT/INR - ( 12 Dec 2017 03:26 )   PT: 11.8 sec;   INR: 1.07 ratio      Vital Signs Last 24 Hrs  T(C): 36.9 (12 Dec 2017 02:33), Max: 36.9 (12 Dec 2017 02:33)  T(F): 98.4 (12 Dec 2017 02:33), Max: 98.4 (12 Dec 2017 02:33)  HR: 115 (12 Dec 2017 02:33) (115 - 115)  BP: 179/101 (12 Dec 2017 02:33) (179/101 - 179/101)  BP(mean): --  RR: 20 (12 Dec 2017 02:33) (20 - 20)  SpO2: 95% (12 Dec 2017 02:33) (95% - 95%)    Radiology:   CXR: awaiting image (ordered)  EKG: awaiting (ordered) PCP: Lombardi    C/c: L ankle pain s/p mechanical fall    HPI: 61yo F w/ PMHx chronic low back pain, chronic pain syndrome, hypothyroidism, HTN and current smoker who presents s/p mechanical fall and sustained L bimalleolar ankle fracture. Orthopedics requested medical consult for jorge-operative risk assessment. Pt states that she went outside and was started. She tried to jump to avoid a pet in the yard and slipped and suffered a deformity in the L ankle. She felt severe pain 10/10.     PMHx: chronic low back pain, chronic pain syndrome, hypothyroidism, HTN and current smoker    Allergies: lyrica (blurred vision, severe intolerance), motrin, NSAID's (rash)    PSHx: R elbow tendon injury and repair plus bone spur repair in the same elbow in     Home Medications:  amitriptyline 150 mg oral tablet: 1 tab(s) orally once a day (at bedtime) (12 Dec 2017 03:22)  atorvastatin 40 mg oral tablet: 1 tab(s) orally once a day (12 Dec 2017 03:22)  cyclobenzaprine 10 mg oral tablet: 1 tab(s) orally 3 times a day (12 Dec 2017 03:22)  desloratadine 5 mg oral tablet: 1 tab(s) orally once a day (12 Dec 2017 03:22)  levothyroxine 112 mcg (0.112 mg) oral tablet: 1 tab(s) orally once a day (12 Dec 2017 03:22)  liothyronine 5 mcg oral tablet: 1 tab(s) orally once a day (12 Dec 2017 03:22)  lisinopril 10 mg oral tablet: 1 tab(s) orally once a day (12 Dec 2017 03:22)  oxyCODONE 20 mg oral tablet: 1 tab(s) orally every 6 hours (12 Dec 2017 03:22)    Family Hx:   mother: HTN, CAD, DM2, breast cancer  age 75 - father:  at 65 w/ multiple different types of cancer    Social Hx:   current smoker, 1/2 ppd x 40 years  etoh denies  recreational drug use denies  retired in , worked as an     ROS:   CONSTITUTIONAL: denies fever, chills, fatigue, weakness  HEENT: denies blurred vision, sore throat  SKIN: denies new lesions, rash  CARDIOVASCULAR: denies chest pain, chest pressure, palpitations  RESPIRATORY: denies shortness of breath, sputum production  GASTROINTESTINAL: denies nausea, vomiting, diarrhea, abdominal pain  GENITOURINARY: denies dysuria, discharge  NEUROLOGICAL: denies numbness, headache, focal weakness  MUSCULOSKELETAL: as per hpi  HEMATOLOGIC: denies gross bleeding, bruising  LYMPHATICS: denies enlarged lymph nodes, extremity swelling  PSYCHIATRIC: denies recent changes in anxiety, depression  ENDOCRINOLOGIC: denies sweating, cold or heat intolerance    Labs:                     14.6   8.5   )-----------( 272      ( 12 Dec 2017 03:26 )             43.1         141  |  104  |  12.0  ----------------------------<  98  4.7   |  25.0  |  0.66    Ca    9.2      12 Dec 2017 03:26    TPro  7.1  /  Alb  3.9  /  TBili  0.4  /  DBili  x   /  AST  28  /  ALT  21  /  AlkPhos  62      PT/INR - ( 12 Dec 2017 03:26 )   PT: 11.8 sec;   INR: 1.07 ratio      Vital Signs Last 24 Hrs  T(C): 36.9 (12 Dec 2017 02:33), Max: 36.9 (12 Dec 2017 02:33)  T(F): 98.4 (12 Dec 2017 02:33), Max: 98.4 (12 Dec 2017 02:33)  HR: 115 (12 Dec 2017 02:33) (115 - 115)  BP: 179/101 (12 Dec 2017 02:33) (179/101 - 179/101)  BP(mean): --  RR: 20 (12 Dec 2017 02:33) (20 - 20)  SpO2: 95% (12 Dec 2017 02:33) (95% - 95%)    Radiology:   CXR: awaiting image (ordered)  EKG: awaiting (ordered) PCP: Lombardi    C/c: L ankle pain s/p mechanical fall    HPI: 61yo F w/ PMHx chronic low back pain, chronic pain syndrome, hypothyroidism, HTN and current smoker who presents s/p mechanical fall and sustained L bimalleolar ankle fracture. Orthopedics requested medical consult for jorge-operative risk assessment. Pt states that she went outside and was started. She tried to jump to avoid a pet in the yard and slipped and suffered a deformity in the L ankle. She felt severe pain 10/10. No other complaints. No lightheadedness prior to fall. Denies CP/SOB prior to fall.     PMHx: chronic low back pain, chronic pain syndrome, hypothyroidism, HTN and current smoker    Allergies: lyrica (blurred vision, severe intolerance), motrin, NSAID's (rash)    PSHx: R elbow tendon injury and repair plus bone spur repair in the same elbow in     Home Medications:  amitriptyline 150 mg oral tablet: 1 tab(s) orally once a day (at bedtime) (12 Dec 2017 03:22)  atorvastatin 40 mg oral tablet: 1 tab(s) orally once a day (12 Dec 2017 03:22)  cyclobenzaprine 10 mg oral tablet: 1 tab(s) orally 3 times a day (12 Dec 2017 03:22)  desloratadine 5 mg oral tablet: 1 tab(s) orally once a day (12 Dec 2017 03:22)  levothyroxine 112 mcg (0.112 mg) oral tablet: 1 tab(s) orally once a day (12 Dec 2017 03:22)  liothyronine 5 mcg oral tablet: 1 tab(s) orally once a day (12 Dec 2017 03:22)  lisinopril 10 mg oral tablet: 1 tab(s) orally once a day (12 Dec 2017 03:22)  oxyCODONE 20 mg oral tablet: 1 tab(s) orally every 6 hours (12 Dec 2017 03:22)    Family Hx:   mother: HTN, CAD, DM2, breast cancer  age 75 - father:  at 65 w/ multiple different types of cancer    Social Hx:   current smoker, 1/2 ppd x 40 years  etoh denies  recreational drug use denies  retired in , worked as an     ROS:   PRE-OP: >4 mets of exercise without dyspnea or SOB. no h/o anesthesia complications in past. no current or history of IVDA. current smoker.   CONSTITUTIONAL: denies fever, chills, fatigue, weakness  HEENT: denies blurred vision, sore throat  SKIN: denies new lesions, rash  CARDIOVASCULAR: denies chest pain, chest pressure, palpitations  RESPIRATORY: denies shortness of breath, sputum production  GASTROINTESTINAL: denies nausea, vomiting, diarrhea, abdominal pain  GENITOURINARY: denies dysuria, discharge  NEUROLOGICAL: denies numbness, headache, focal weakness  MUSCULOSKELETAL: as per hpi  HEMATOLOGIC: denies gross bleeding, bruising  LYMPHATICS: denies enlarged lymph nodes, extremity swelling  PSYCHIATRIC: denies recent changes in anxiety, depression  ENDOCRINOLOGIC: denies sweating, cold or heat intolerance    Labs:                     14.6   8.5   )-----------( 272      ( 12 Dec 2017 03:26 )             43.1         141  |  104  |  12.0  ----------------------------<  98  4.7   |  25.0  |  0.66    Ca    9.2      12 Dec 2017 03:26    TPro  7.1  /  Alb  3.9  /  TBili  0.4  /  DBili  x   /  AST  28  /  ALT  21  /  AlkPhos  62      PT/INR - ( 12 Dec 2017 03:26 )   PT: 11.8 sec;   INR: 1.07 ratio      Vital Signs Last 24 Hrs  T(C): 36.9 (12 Dec 2017 02:33), Max: 36.9 (12 Dec 2017 02:33)  T(F): 98.4 (12 Dec 2017 02:33), Max: 98.4 (12 Dec 2017 02:33)  HR: 115 (12 Dec 2017 02:33) (115 - 115)  BP: 179/101 (12 Dec 2017 02:33) (179/101 - 179/101)  BP(mean): --  RR: 20 (12 Dec 2017 02:33) (20 - 20)  SpO2: 95% (12 Dec 2017 02:33) (95% - 95%)    Radiology:   CXR: awaiting image (ordered)  EKG: awaiting (ordered) PCP: Lombardi    C/c: L ankle pain s/p mechanical fall    HPI: 63yo F w/ PMHx chronic low back pain, chronic pain syndrome, hypothyroidism, HTN and current smoker who presents s/p mechanical fall and sustained L bimalleolar ankle fracture. Orthopedics requested medical consult for jorge-operative risk assessment. Pt states that she went outside and was started. She tried to jump to avoid a pet in the yard and slipped and suffered a deformity in the L ankle. She felt severe pain 10/10. No other complaints. No lightheadedness prior to fall. Denies CP/SOB prior to fall.     PMHx: chronic low back pain, chronic pain syndrome, hypothyroidism, HTN and current smoker    Allergies: lyrica (blurred vision, severe intolerance), motrin, NSAID's (rash)    PSHx: R elbow tendon injury and repair plus bone spur repair in the same elbow in     Home Medications:  amitriptyline 150 mg oral tablet: 1 tab(s) orally once a day (at bedtime) (12 Dec 2017 03:22)  atorvastatin 40 mg oral tablet: 1 tab(s) orally once a day (12 Dec 2017 03:22)  cyclobenzaprine 10 mg oral tablet: 1 tab(s) orally 3 times a day (12 Dec 2017 03:22)  desloratadine 5 mg oral tablet: 1 tab(s) orally once a day (12 Dec 2017 03:22)  levothyroxine 112 mcg (0.112 mg) oral tablet: 1 tab(s) orally once a day (12 Dec 2017 03:22)  liothyronine 5 mcg oral tablet: 1 tab(s) orally once a day (12 Dec 2017 03:22)  lisinopril 10 mg oral tablet: 1 tab(s) orally once a day (12 Dec 2017 03:22)  oxyCODONE 20 mg oral tablet: 1 tab(s) orally every 6 hours (12 Dec 2017 03:22)    Family Hx:   mother: HTN, CAD, DM2, breast cancer  age 75 - father:  at 65 w/ multiple different types of cancer    Social Hx:   current smoker, 1/2 ppd x 40 years  etoh denies  recreational drug use denies  retired in , worked as an     ROS:   PRE-OP: >4 mets of exercise without dyspnea or SOB. no h/o anesthesia complications in past. no current or history of IVDA. current smoker.   CONSTITUTIONAL: denies fever, chills, fatigue, weakness  HEENT: denies blurred vision, sore throat  SKIN: denies new lesions, rash  CARDIOVASCULAR: denies chest pain, chest pressure, palpitations  RESPIRATORY: denies shortness of breath, sputum production  GASTROINTESTINAL: denies nausea, vomiting, diarrhea, abdominal pain  GENITOURINARY: denies dysuria, discharge  NEUROLOGICAL: denies numbness, headache, focal weakness  MUSCULOSKELETAL: as per hpi  HEMATOLOGIC: denies gross bleeding, bruising  LYMPHATICS: denies enlarged lymph nodes, extremity swelling  PSYCHIATRIC: denies recent changes in anxiety, depression  ENDOCRINOLOGIC: denies sweating, cold or heat intolerance    Labs:                     14.6   8.5   )-----------( 272      ( 12 Dec 2017 03:26 )             43.1         141  |  104  |  12.0  ----------------------------<  98  4.7   |  25.0  |  0.66    Ca    9.2      12 Dec 2017 03:26    TPro  7.1  /  Alb  3.9  /  TBili  0.4  /  DBili  x   /  AST  28  /  ALT  21  /  AlkPhos  62      PT/INR - ( 12 Dec 2017 03:26 )   PT: 11.8 sec;   INR: 1.07 ratio      Vital Signs Last 24 Hrs:  T(C): 36.9 (12 Dec 2017 02:33), Max: 36.9 (12 Dec 2017 02:33)  T(F): 98.4 (12 Dec 2017 02:33), Max: 98.4 (12 Dec 2017 02:33)  HR: 115 (12 Dec 2017 02:33) (115 - 115)  BP: 179/101 (12 Dec 2017 02:33) (179/101 - 179/101)  BP(mean): --  RR: 20 (12 Dec 2017 02:) (20 - 20)  SpO2: 95% (12 Dec 2017 02:) (95% - 95%)    PE:   GENERAL: patient appears well, no acute distress, appropriate, pleasant  EYES: sclera clear, no exudates  ENMT: oropharynx clear without erythema, no exudates, moist mucous membranes  NECK: supple, soft, no thyromegaly noted  LUNGS: good air entry bilaterally, clear to auscultation. trace expiratory wheezing appreciated in L mid chest. no accessory muscle use.   HEART: soft S1/S2, regular rate and rhythm, no murmurs noted, no lower extremity edema  GASTROINTESTINAL: abdomen is soft, nontender, nondistended, normoactive bowel sounds, no palpable masses  INTEGUMENT: good skin turgor, no lesions noted  MUSCULOSKELETAL: splint in place on L ankle. no tenderess to palpation of knees, hips, wrists b/l  NEUROLOGIC: awake, alert, oriented x3, good muscle tone in 4 extremities, no obvious sensory deficits. sensation of digits of L foot intact and able to move digits of L foot upon request.   PSYCHIATRIC: mood is good, affect is congruent, linear and logical thought process  HEME/LYMPH: no palpable supraclavicular nodules, no obvious ecchymosis or petechiae     Radiology:   CXR: I personally reviewed the CXR which was obtained this morning. I cannot appreciate any acute pulmonary process  EKG personally reviewed and my interpretation is: normal sinus rhythm @ 80, normal axis, normal rhythm, intervals within normal limits, no significant chamber enlargement, no ST segment abnormalities, no clinically significant Q waves

## 2017-12-13 ENCOUNTER — TRANSCRIPTION ENCOUNTER (OUTPATIENT)
Age: 62
End: 2017-12-13

## 2017-12-13 LAB
ANION GAP SERPL CALC-SCNC: 12 MMOL/L — SIGNIFICANT CHANGE UP (ref 5–17)
BASOPHILS # BLD AUTO: 0 K/UL — SIGNIFICANT CHANGE UP (ref 0–0.2)
BASOPHILS NFR BLD AUTO: 0.1 % — SIGNIFICANT CHANGE UP (ref 0–2)
BUN SERPL-MCNC: 9 MG/DL — SIGNIFICANT CHANGE UP (ref 8–20)
CALCIUM SERPL-MCNC: 9.3 MG/DL — SIGNIFICANT CHANGE UP (ref 8.6–10.2)
CHLORIDE SERPL-SCNC: 103 MMOL/L — SIGNIFICANT CHANGE UP (ref 98–107)
CO2 SERPL-SCNC: 26 MMOL/L — SIGNIFICANT CHANGE UP (ref 22–29)
CREAT SERPL-MCNC: 0.62 MG/DL — SIGNIFICANT CHANGE UP (ref 0.5–1.3)
EOSINOPHIL # BLD AUTO: 0 K/UL — SIGNIFICANT CHANGE UP (ref 0–0.5)
EOSINOPHIL NFR BLD AUTO: 0 % — SIGNIFICANT CHANGE UP (ref 0–6)
GLUCOSE SERPL-MCNC: 102 MG/DL — SIGNIFICANT CHANGE UP (ref 70–115)
HCT VFR BLD CALC: 40.9 % — SIGNIFICANT CHANGE UP (ref 37–47)
HGB BLD-MCNC: 13.9 G/DL — SIGNIFICANT CHANGE UP (ref 12–16)
LYMPHOCYTES # BLD AUTO: 1.8 K/UL — SIGNIFICANT CHANGE UP (ref 1–4.8)
LYMPHOCYTES # BLD AUTO: 15.6 % — LOW (ref 20–55)
MCHC RBC-ENTMCNC: 29.4 PG — SIGNIFICANT CHANGE UP (ref 27–31)
MCHC RBC-ENTMCNC: 34 G/DL — SIGNIFICANT CHANGE UP (ref 32–36)
MCV RBC AUTO: 86.5 FL — SIGNIFICANT CHANGE UP (ref 81–99)
MONOCYTES # BLD AUTO: 0.8 K/UL — SIGNIFICANT CHANGE UP (ref 0–0.8)
MONOCYTES NFR BLD AUTO: 6.3 % — SIGNIFICANT CHANGE UP (ref 3–10)
NEUTROPHILS # BLD AUTO: 9.2 K/UL — HIGH (ref 1.8–8)
NEUTROPHILS NFR BLD AUTO: 77.7 % — HIGH (ref 37–73)
PLATELET # BLD AUTO: 264 K/UL — SIGNIFICANT CHANGE UP (ref 150–400)
POTASSIUM SERPL-MCNC: 4 MMOL/L — SIGNIFICANT CHANGE UP (ref 3.5–5.3)
POTASSIUM SERPL-SCNC: 4 MMOL/L — SIGNIFICANT CHANGE UP (ref 3.5–5.3)
RBC # BLD: 4.73 M/UL — SIGNIFICANT CHANGE UP (ref 4.4–5.2)
RBC # FLD: 13.6 % — SIGNIFICANT CHANGE UP (ref 11–15.6)
SODIUM SERPL-SCNC: 141 MMOL/L — SIGNIFICANT CHANGE UP (ref 135–145)
WBC # BLD: 11.9 K/UL — HIGH (ref 4.8–10.8)
WBC # FLD AUTO: 11.9 K/UL — HIGH (ref 4.8–10.8)

## 2017-12-13 PROCEDURE — 99233 SBSQ HOSP IP/OBS HIGH 50: CPT

## 2017-12-13 RX ORDER — HYDROMORPHONE HYDROCHLORIDE 2 MG/ML
1 INJECTION INTRAMUSCULAR; INTRAVENOUS; SUBCUTANEOUS
Qty: 0 | Refills: 0 | Status: DISCONTINUED | OUTPATIENT
Start: 2017-12-13 | End: 2017-12-14

## 2017-12-13 RX ORDER — OXYCODONE HYDROCHLORIDE 5 MG/1
10 TABLET ORAL
Qty: 0 | Refills: 0 | Status: DISCONTINUED | OUTPATIENT
Start: 2017-12-13 | End: 2017-12-13

## 2017-12-13 RX ORDER — OXYCODONE HYDROCHLORIDE 5 MG/1
40 TABLET ORAL EVERY 12 HOURS
Qty: 0 | Refills: 0 | Status: DISCONTINUED | OUTPATIENT
Start: 2017-12-13 | End: 2017-12-14

## 2017-12-13 RX ORDER — OXYCODONE HYDROCHLORIDE 5 MG/1
15 TABLET ORAL
Qty: 0 | Refills: 0 | Status: DISCONTINUED | OUTPATIENT
Start: 2017-12-13 | End: 2017-12-13

## 2017-12-13 RX ORDER — HYDROMORPHONE HYDROCHLORIDE 2 MG/ML
0.5 INJECTION INTRAMUSCULAR; INTRAVENOUS; SUBCUTANEOUS
Qty: 0 | Refills: 0 | Status: DISCONTINUED | OUTPATIENT
Start: 2017-12-13 | End: 2017-12-14

## 2017-12-13 RX ORDER — SENNOSIDES/DOCUSATE SODIUM 8.6MG-50MG
2 TABLET ORAL
Qty: 28 | Refills: 0 | OUTPATIENT
Start: 2017-12-13 | End: 2017-12-26

## 2017-12-13 RX ORDER — ACETAMINOPHEN 500 MG
1000 TABLET ORAL EVERY 8 HOURS
Qty: 0 | Refills: 0 | Status: COMPLETED | OUTPATIENT
Start: 2017-12-13 | End: 2017-12-13

## 2017-12-13 RX ORDER — SODIUM CHLORIDE 9 MG/ML
1000 INJECTION, SOLUTION INTRAVENOUS
Qty: 0 | Refills: 0 | Status: DISCONTINUED | OUTPATIENT
Start: 2017-12-13 | End: 2017-12-14

## 2017-12-13 RX ORDER — OXYCODONE HYDROCHLORIDE 5 MG/1
1 TABLET ORAL
Qty: 30 | Refills: 0 | OUTPATIENT
Start: 2017-12-13 | End: 2017-12-17

## 2017-12-13 RX ADMIN — Medication 112 MICROGRAM(S): at 06:17

## 2017-12-13 RX ADMIN — Medication 150 MILLIGRAM(S): at 21:16

## 2017-12-13 RX ADMIN — Medication 150 MILLIGRAM(S): at 00:29

## 2017-12-13 RX ADMIN — HYDROMORPHONE HYDROCHLORIDE 1 MILLIGRAM(S): 2 INJECTION INTRAMUSCULAR; INTRAVENOUS; SUBCUTANEOUS at 17:17

## 2017-12-13 RX ADMIN — Medication 100 MILLIGRAM(S): at 21:16

## 2017-12-13 RX ADMIN — SODIUM CHLORIDE 80 MILLILITER(S): 9 INJECTION, SOLUTION INTRAVENOUS at 06:19

## 2017-12-13 RX ADMIN — Medication 100 MILLIGRAM(S): at 13:49

## 2017-12-13 RX ADMIN — OXYCODONE HYDROCHLORIDE 40 MILLIGRAM(S): 5 TABLET ORAL at 19:05

## 2017-12-13 RX ADMIN — HYDROMORPHONE HYDROCHLORIDE 0.5 MILLIGRAM(S): 2 INJECTION INTRAMUSCULAR; INTRAVENOUS; SUBCUTANEOUS at 12:29

## 2017-12-13 RX ADMIN — HYDROMORPHONE HYDROCHLORIDE 1 MILLIGRAM(S): 2 INJECTION INTRAMUSCULAR; INTRAVENOUS; SUBCUTANEOUS at 17:02

## 2017-12-13 RX ADMIN — LIOTHYRONINE SODIUM 5 MICROGRAM(S): 25 TABLET ORAL at 06:17

## 2017-12-13 RX ADMIN — HYDROMORPHONE HYDROCHLORIDE 0.5 MILLIGRAM(S): 2 INJECTION INTRAMUSCULAR; INTRAVENOUS; SUBCUTANEOUS at 02:00

## 2017-12-13 RX ADMIN — OXYCODONE HYDROCHLORIDE 40 MILLIGRAM(S): 5 TABLET ORAL at 18:20

## 2017-12-13 RX ADMIN — HYDROMORPHONE HYDROCHLORIDE 2 MILLIGRAM(S): 2 INJECTION INTRAMUSCULAR; INTRAVENOUS; SUBCUTANEOUS at 13:50

## 2017-12-13 RX ADMIN — LORATADINE 10 MILLIGRAM(S): 10 TABLET ORAL at 13:50

## 2017-12-13 RX ADMIN — Medication 100 MILLIGRAM(S): at 06:17

## 2017-12-13 RX ADMIN — LISINOPRIL 10 MILLIGRAM(S): 2.5 TABLET ORAL at 06:17

## 2017-12-13 RX ADMIN — HYDROMORPHONE HYDROCHLORIDE 0.5 MILLIGRAM(S): 2 INJECTION INTRAMUSCULAR; INTRAVENOUS; SUBCUTANEOUS at 01:36

## 2017-12-13 RX ADMIN — HYDROMORPHONE HYDROCHLORIDE 2 MILLIGRAM(S): 2 INJECTION INTRAMUSCULAR; INTRAVENOUS; SUBCUTANEOUS at 10:35

## 2017-12-13 RX ADMIN — Medication 1000 MILLIGRAM(S): at 22:00

## 2017-12-13 RX ADMIN — ENOXAPARIN SODIUM 40 MILLIGRAM(S): 100 INJECTION SUBCUTANEOUS at 06:17

## 2017-12-13 RX ADMIN — HYDROMORPHONE HYDROCHLORIDE 2 MILLIGRAM(S): 2 INJECTION INTRAMUSCULAR; INTRAVENOUS; SUBCUTANEOUS at 14:35

## 2017-12-13 RX ADMIN — Medication 400 MILLIGRAM(S): at 21:15

## 2017-12-13 RX ADMIN — SODIUM CHLORIDE 80 MILLILITER(S): 9 INJECTION, SOLUTION INTRAVENOUS at 17:03

## 2017-12-13 RX ADMIN — OXYCODONE HYDROCHLORIDE 15 MILLIGRAM(S): 5 TABLET ORAL at 16:10

## 2017-12-13 RX ADMIN — ATORVASTATIN CALCIUM 40 MILLIGRAM(S): 80 TABLET, FILM COATED ORAL at 21:15

## 2017-12-13 RX ADMIN — OXYCODONE HYDROCHLORIDE 15 MILLIGRAM(S): 5 TABLET ORAL at 15:34

## 2017-12-13 RX ADMIN — HYDROMORPHONE HYDROCHLORIDE 0.5 MILLIGRAM(S): 2 INJECTION INTRAMUSCULAR; INTRAVENOUS; SUBCUTANEOUS at 12:45

## 2017-12-13 RX ADMIN — HYDROMORPHONE HYDROCHLORIDE 2 MILLIGRAM(S): 2 INJECTION INTRAMUSCULAR; INTRAVENOUS; SUBCUTANEOUS at 09:49

## 2017-12-13 RX ADMIN — SODIUM CHLORIDE 80 MILLILITER(S): 9 INJECTION, SOLUTION INTRAVENOUS at 21:15

## 2017-12-13 NOTE — DISCHARGE NOTE ADULT - MEDICATION SUMMARY - MEDICATIONS TO TAKE
I will START or STAY ON the medications listed below when I get home from the hospital:    Aspirin Enteric Coated 325 mg oral delayed release tablet  -- 1 tab(s) by mouth 2 times a day   -- Swallow whole.  Do not crush.  Take with food or milk.    -- Indication: For DVTP    lisinopril 10 mg oral tablet  -- 1 tab(s) by mouth once a day  -- Indication: For Home med    amitriptyline 150 mg oral tablet  -- 1 tab(s) by mouth once a day (at bedtime)  -- Indication: For Home med    desloratadine 5 mg oral tablet  -- 1 tab(s) by mouth once a day  -- Indication: For Home med    atorvastatin 40 mg oral tablet  -- 1 tab(s) by mouth once a day  -- Indication: For Home med    Senna S 50 mg-8.6 mg oral tablet  -- 2 tab(s) by mouth once a day (at bedtime)   -- Medication should be taken with plenty of water.    -- Indication: For Constipation    cyclobenzaprine 10 mg oral tablet  -- 1 tab(s) by mouth 3 times a day  -- Indication: For Home med    liothyronine 5 mcg oral tablet  -- 1 tab(s) by mouth once a day  -- Indication: For Home med    levothyroxine 112 mcg (0.112 mg) oral tablet  -- 1 tab(s) by mouth once a day  -- Indication: For Home med I will START or STAY ON the medications listed below when I get home from the hospital:    lisinopril 10 mg oral tablet  -- 1 tab(s) by mouth once a day  -- Indication: For Home med    amitriptyline 150 mg oral tablet  -- 1 tab(s) by mouth once a day (at bedtime)  -- Indication: For Home med    desloratadine 5 mg oral tablet  -- 1 tab(s) by mouth once a day  -- Indication: For Home med    atorvastatin 40 mg oral tablet  -- 1 tab(s) by mouth once a day  -- Indication: For Home med    Senna S 50 mg-8.6 mg oral tablet  -- 2 tab(s) by mouth once a day (at bedtime)   -- Medication should be taken with plenty of water.    -- Indication: For Constipation    cyclobenzaprine 10 mg oral tablet  -- 1 tab(s) by mouth 3 times a day  -- Indication: For Home med    liothyronine 5 mcg oral tablet  -- 1 tab(s) by mouth once a day  -- Indication: For Home med    levothyroxine 112 mcg (0.112 mg) oral tablet  -- 1 tab(s) by mouth once a day  -- Indication: For Home med

## 2017-12-13 NOTE — DISCHARGE NOTE ADULT - PATIENT PORTAL LINK FT
“You can access the [employer] Patient Portal, offered by [facility], by registering with the following website: [email or web address]”

## 2017-12-13 NOTE — PROGRESS NOTE ADULT - SUBJECTIVE AND OBJECTIVE BOX
CC: Left ankle Bimall fx     INTERVAL HPI/OVERNIGHT EVENTS:    Vital Signs Last 24 Hrs  T(C): 37.3 (13 Dec 2017 07:53), Max: 37.3 (13 Dec 2017 01:16)  T(F): 99.1 (13 Dec 2017 07:53), Max: 99.2 (13 Dec 2017 01:16)  HR: 78 (13 Dec 2017 07:53) (78 - 101)  BP: 108/71 (13 Dec 2017 07:53) (105/71 - 160/75)  BP(mean): --  RR: 18 (13 Dec 2017 07:53) (12 - 23)  SpO2: 98% (13 Dec 2017 07:53) (93% - 99%)  I&O's Detail                                14.6   8.5   )-----------( 272      ( 12 Dec 2017 03:26 )             43.1     12 Dec 2017 03:26    141    |  104    |  12.0   ----------------------------<  98     4.7     |  25.0   |  0.66     Ca    9.2        12 Dec 2017 03:26    TPro  7.1    /  Alb  3.9    /  TBili  0.4    /  DBili  x      /  AST  28     /  ALT  21     /  AlkPhos  62     12 Dec 2017 03:26    PT/INR - ( 12 Dec 2017 03:26 )   PT: 11.8 sec;   INR: 1.07 ratio           CAPILLARY BLOOD GLUCOSE        LIVER FUNCTIONS - ( 12 Dec 2017 03:26 )  Alb: 3.9 g/dL / Pro: 7.1 g/dL / ALK PHOS: 62 U/L / ALT: 21 U/L / AST: 28 U/L / GGT: x               MEDICATIONS  (STANDING):  amitriptyline 150 milliGRAM(s) Oral at bedtime  atorvastatin 40 milliGRAM(s) Oral at bedtime  docusate sodium 100 milliGRAM(s) Oral three times a day  enoxaparin Injectable 40 milliGRAM(s) SubCutaneous every 24 hours  influenza   Vaccine 0.5 milliLiter(s) IntraMuscular once  lactated ringers. 1000 milliLiter(s) (80 mL/Hr) IV Continuous <Continuous>  levothyroxine 112 MICROGram(s) Oral daily  liothyronine 5 MICROGram(s) Oral daily  lisinopril 10 milliGRAM(s) Oral daily  loratadine 10 milliGRAM(s) Oral daily    MEDICATIONS  (PRN):  acetaminophen   Tablet. 650 milliGRAM(s) Oral every 6 hours PRN Mild Pain (1 - 3)  cyclobenzaprine 10 milliGRAM(s) Oral three times a day PRN Muscle Spasm  HYDROmorphone   Tablet 2 milliGRAM(s) Oral every 4 hours PRN Severe Pain (7 - 10)  HYDROmorphone  Injectable 0.5 milliGRAM(s) IV Push every 6 hours PRN Severe Pain (7 - 10) Breaktrough  HYDROmorphone  Injectable 0.5 milliGRAM(s) IV Push every 10 minutes PRN Severe Pain (7 - 10)  magnesium hydroxide Suspension 30 milliLiter(s) Oral daily PRN Constipation  ondansetron Injectable 4 milliGRAM(s) IV Push every 6 hours PRN Nausea and/or Vomiting  oxyCODONE    IR 10 milliGRAM(s) Oral every 4 hours PRN Moderate Pain  oxyCODONE    IR 5 milliGRAM(s) Oral every 4 hours PRN Mild Pain      RADIOLOGY & ADDITIONAL TESTS: CC: Left ankle Bimall fx     INTERVAL HPI/OVERNIGHT EVENTS: No acute events overnight. C/O left ankle pain, denies chest pain, SOB, dizziness, lightheadedness, nausea, vomiting, fever, chills. Labs pending  Vital Signs Last 24 Hrs  T(C): 37.3 (13 Dec 2017 07:53), Max: 37.3 (13 Dec 2017 01:16)  T(F): 99.1 (13 Dec 2017 07:53), Max: 99.2 (13 Dec 2017 01:16)  HR: 78 (13 Dec 2017 07:53) (78 - 101)  BP: 108/71 (13 Dec 2017 07:53) (105/71 - 160/75)  BP(mean): --  RR: 18 (13 Dec 2017 07:53) (12 - 23)  SpO2: 98% (13 Dec 2017 07:53) (93% - 99%)  I&O's Detail      PHYSICAL EXAM:    GENERAL: NAD, well-groomed, well-developed  HEAD:  Atraumatic, Normocephalic  NECK: Supple, No JVD, Normal thyroid  CHEST/LUNG: Clear to percussion bilaterally; No rales, rhonchi, wheezing, or rubs  HEART: Regular rate and rhythm; No murmurs, rubs, or gallops  ABDOMEN: Soft, Nontender, Nondistended; Bowel sounds present  EXTREMITIES:  Left ACE wrap/splint,  in place, C/D/I                          14.6   8.5   )-----------( 272      ( 12 Dec 2017 03:26 )             43.1     12 Dec 2017 03:26    141    |  104    |  12.0   ----------------------------<  98     4.7     |  25.0   |  0.66     Ca    9.2        12 Dec 2017 03:26    TPro  7.1    /  Alb  3.9    /  TBili  0.4    /  DBili  x      /  AST  28     /  ALT  21     /  AlkPhos  62     12 Dec 2017 03:26    PT/INR - ( 12 Dec 2017 03:26 )   PT: 11.8 sec;   INR: 1.07 ratio           CAPILLARY BLOOD GLUCOSE        LIVER FUNCTIONS - ( 12 Dec 2017 03:26 )  Alb: 3.9 g/dL / Pro: 7.1 g/dL / ALK PHOS: 62 U/L / ALT: 21 U/L / AST: 28 U/L / GGT: x               MEDICATIONS  (STANDING):  amitriptyline 150 milliGRAM(s) Oral at bedtime  atorvastatin 40 milliGRAM(s) Oral at bedtime  docusate sodium 100 milliGRAM(s) Oral three times a day  enoxaparin Injectable 40 milliGRAM(s) SubCutaneous every 24 hours  influenza   Vaccine 0.5 milliLiter(s) IntraMuscular once  lactated ringers. 1000 milliLiter(s) (80 mL/Hr) IV Continuous <Continuous>  levothyroxine 112 MICROGram(s) Oral daily  liothyronine 5 MICROGram(s) Oral daily  lisinopril 10 milliGRAM(s) Oral daily  loratadine 10 milliGRAM(s) Oral daily    MEDICATIONS  (PRN):  acetaminophen   Tablet. 650 milliGRAM(s) Oral every 6 hours PRN Mild Pain (1 - 3)  cyclobenzaprine 10 milliGRAM(s) Oral three times a day PRN Muscle Spasm  HYDROmorphone   Tablet 2 milliGRAM(s) Oral every 4 hours PRN Severe Pain (7 - 10)  HYDROmorphone  Injectable 0.5 milliGRAM(s) IV Push every 6 hours PRN Severe Pain (7 - 10) Breaktrough  HYDROmorphone  Injectable 0.5 milliGRAM(s) IV Push every 10 minutes PRN Severe Pain (7 - 10)  magnesium hydroxide Suspension 30 milliLiter(s) Oral daily PRN Constipation  ondansetron Injectable 4 milliGRAM(s) IV Push every 6 hours PRN Nausea and/or Vomiting  oxyCODONE    IR 10 milliGRAM(s) Oral every 4 hours PRN Moderate Pain  oxyCODONE    IR 5 milliGRAM(s) Oral every 4 hours PRN Mild Pain      RADIOLOGY & ADDITIONAL TESTS:

## 2017-12-13 NOTE — DISCHARGE NOTE ADULT - PLAN OF CARE
The patient will be seen in the office between 1-2 weeks for splint removal and wound check. Sutures/Staples will be removed at that time. Patient instructed to keep splint clean and dry. The patient will continue with splint as applied in hospital and not remove until re-evaluation in the office. The patient will contact the office if the wound becomes red, has increasing pain, develops bleeding or discharge, an injury occurs, or has other concerns. The patient will ASPIRIN 325BID x 4 weeks for DVTP. The patient will take Oxycodone and Tylenol for pain control and titrate according to prescription and patient needs. The patient is NON-weight bearing on the left lower extremity. The patient is recommended to elevated the affected extremity to reduce swelling. If the splint/cast  becomes too tight, the patient is to immediately elevate and contact the office to discuss further management or immediately proceed to the office if unable to make contact with the office. improve pain and restore ability to ambulate and perform ADL

## 2017-12-13 NOTE — PHYSICAL THERAPY INITIAL EVALUATION ADULT - ACTIVE RANGE OF MOTION EXAMINATION, REHAB EVAL
deficits as listed below/no Active ROM deficits were identified/difficulty moving the left LE; unable to move left ankle due to cast

## 2017-12-13 NOTE — PHYSICAL THERAPY INITIAL EVALUATION ADULT - STAIR PATTERN, REHAB EVAL
stair bumping/mod. A x1 for sitting on the step; supervision and verbal cues for bumping up and down the steps

## 2017-12-13 NOTE — PHYSICAL THERAPY INITIAL EVALUATION ADULT - MANUAL MUSCLE TESTING RESULTS, REHAB EVAL
no strength deficits were identified/with the exception of the right knee 3/5, and right hip 3-/5; unable to move ankle due to cast

## 2017-12-13 NOTE — PROGRESS NOTE ADULT - ASSESSMENT
Problem/Plan -1:  ·  Problem: Ankle fracture, bimalleolar, closed, left, initial encounter.  Plan: pain medication for better control   surgery today per primary team , medically stable.      Problem/Plan - 2:  ·  Problem: Essential hypertension.  Plan: better controlled now, need pain control, monitor BP closely.      Problem/Plan - 3:  ·  Problem: Hypothyroidism (acquired).  Plan: on levothyroxine.      Problem/Plan - 4:  ·  Problem: Nicotine dependence, cigarettes, uncomplicated.  Plan: counseling given , nicotine patch as needed [post op.      Problem/Plan - 5:  ·  Problem: Prophylactic measure.  Plan: post op dvt prophylaxis  per ortho   will add bowel regimen for possible opiate induced constipation. Problem/Plan -1:  ·  Problem: Ankle fracture, bimalleolar, closed, left, initial encounter.  Plan: POD#1 s/p left ankle ORIF  DVT ppx as per Ortho  pain control/PT  elevation  IS  Labs ordered     Problem/Plan - 2:  ·  Problem: Essential hypertension.  Plan: controlled on current RX, dc IV fluids when taking po well     Problem/Plan - 3:  ·  Problem: Hypothyroidism (acquired).  Plan: on levothyroxine.      Problem/Plan - 4:  ·  Problem: Nicotine dependence, cigarettes, uncomplicated.  Plan: counseling given , nicotine patch as needed [post op.      Problem/Plan - 5:  ·  Problem: Prophylactic measure.  Plan: post op dvt prophylaxis  per ortho   bowel regimen for possible opiate induced constipation. Problem/Plan -1:  ·  Problem: Ankle fracture, bimalleolar, closed, left, initial encounter.  Plan: POD#1 s/p left ankle ORIF  DVT ppx as per Ortho  pain control/PT  elevation  IS  Labs reviewed  no medical contraindications to discharge.     Problem/Plan - 2:  ·  Problem: Essential hypertension.  Plan: controlled on current RX, dc IV fluids when taking po well     Problem/Plan - 3:  ·  Problem: Hypothyroidism (acquired).  Plan: on levothyroxine.      Problem/Plan - 4:  ·  Problem: Nicotine dependence, cigarettes, uncomplicated.  Plan: counseling given , nicotine patch as needed [post op.      Problem/Plan - 5:  ·  Problem: Prophylactic measure.  Plan: post op dvt prophylaxis  per ortho   bowel regimen for possible opiate induced constipation.     No medical contraindications to discharge.

## 2017-12-13 NOTE — PROGRESS NOTE ADULT - SUBJECTIVE AND OBJECTIVE BOX
pt pod 1 sp left ankle ORIF under GETA. Tolerated well. Denies any A/c.   pt with no n/v @ this time. using pain meds as ordered/needed with some pain relief. vss. spont vent. no issues with anesthesia at this time. pt resting comfortably @ this time.

## 2017-12-13 NOTE — DISCHARGE NOTE ADULT - HOSPITAL COURSE
The patient underwent a left ankle OPEN REDUCTION AND INTERNAL FIXATION on 12/12/17 for treatment of a left ankle fracture. The patient received antibiotics consistent with SCIP guidelines. The patient was medically cleared and underwent the procedure and had no intra-operative complications. Post-operatively, the patient was seen by medicine and PT. The patient received LOVENOX for DVTP. The patient received pain medications per orthopedic pain managment protocol and the pain was appropriately controlled. Patient was evaluated by PT and instructed on gait training. The patient was NON-weight bearing. The patient did not have any post-operative medical complications. The patient was discharged in stable condition. The patient underwent a left ankle OPEN REDUCTION AND INTERNAL FIXATION on 12/12/17 for treatment of a left ankle fracture. The patient received antibiotics consistent with SCIP guidelines. The patient was medically cleared and underwent the procedure and had no intra-operative complications. Post-operatively, the patient was seen by medicine and PT. The patient received LOVENOX for DVTP while in house. The patient received pain medications per orthopedic pain managment protocol and the pain was appropriately controlled. Patient was evaluated by PT and instructed on gait training. The patient was NON-weight bearing. The patient did not have any post-operative medical complications. The patient was discharged in stable condition.

## 2017-12-13 NOTE — DISCHARGE NOTE ADULT - CARE PROVIDER_API CALL
Satish Barone), Orthopaedic Surgery  [address] [place] [address]  Phone: [phone]  Fax: [phone]

## 2017-12-13 NOTE — PROGRESS NOTE ADULT - SUBJECTIVE AND OBJECTIVE BOX
Ortho Progress note    Name: JOBY JUÁREZ    MR #: [id number]    Procedure: left ankle ORIF  Surgeon: Dr. Barone    Pt comfortable without complaints, pain controlled  Denies CP, SOB, N/V, numbness/tingling     Vital Signs Last 24 Hrs  T(C): 37.3 (13 Dec 2017 01:16), Max: 37.3 (13 Dec 2017 01:16)  T(F): 99.2 (13 Dec 2017 01:16), Max: 99.2 (13 Dec 2017 01:16)  HR: 96 (13 Dec 2017 01:16) (85 - 101)  BP: 105/71 (13 Dec 2017 01:16) (105/71 - 160/75)  BP(mean): --  RR: 18 (13 Dec 2017 01:16) (12 - 23)  SpO2: 99% (13 Dec 2017 01:16) (93% - 99%)    General: Pt Alert and oriented, NAD, controlled pain.  Dressing/splint C/D/I. No bleeding.  Cap refill < 2 sec.  Sensation: Grossly intact to light touch without deficit.  Motor: + EHL/FHL    A/P: 62yFemale POD#1 s/p left ankle ORIF  - Stable  - elevation  - Pain Control  - DVT ppx: Lovenox while inpatient, DC home on ASA  - PT eval pending  - Weight bearing status: NWB LLE  - possible DC home today

## 2017-12-14 VITALS
SYSTOLIC BLOOD PRESSURE: 133 MMHG | OXYGEN SATURATION: 98 % | TEMPERATURE: 99 F | RESPIRATION RATE: 18 BRPM | DIASTOLIC BLOOD PRESSURE: 78 MMHG | HEART RATE: 88 BPM

## 2017-12-14 DIAGNOSIS — G89.4 CHRONIC PAIN SYNDROME: ICD-10-CM

## 2017-12-14 DIAGNOSIS — G89.18 OTHER ACUTE POSTPROCEDURAL PAIN: ICD-10-CM

## 2017-12-14 DIAGNOSIS — R26.9 UNSPECIFIED ABNORMALITIES OF GAIT AND MOBILITY: ICD-10-CM

## 2017-12-14 PROCEDURE — 85027 COMPLETE CBC AUTOMATED: CPT

## 2017-12-14 PROCEDURE — 86850 RBC ANTIBODY SCREEN: CPT

## 2017-12-14 PROCEDURE — 97530 THERAPEUTIC ACTIVITIES: CPT

## 2017-12-14 PROCEDURE — 73610 X-RAY EXAM OF ANKLE: CPT | Mod: 26,LT

## 2017-12-14 PROCEDURE — 97116 GAIT TRAINING THERAPY: CPT

## 2017-12-14 PROCEDURE — 93005 ELECTROCARDIOGRAM TRACING: CPT

## 2017-12-14 PROCEDURE — 99285 EMERGENCY DEPT VISIT HI MDM: CPT | Mod: 25

## 2017-12-14 PROCEDURE — 76000 FLUOROSCOPY <1 HR PHYS/QHP: CPT

## 2017-12-14 PROCEDURE — 36415 COLL VENOUS BLD VENIPUNCTURE: CPT

## 2017-12-14 PROCEDURE — 85610 PROTHROMBIN TIME: CPT

## 2017-12-14 PROCEDURE — 86900 BLOOD TYPING SEROLOGIC ABO: CPT

## 2017-12-14 PROCEDURE — 84436 ASSAY OF TOTAL THYROXINE: CPT

## 2017-12-14 PROCEDURE — 86901 BLOOD TYPING SEROLOGIC RH(D): CPT

## 2017-12-14 PROCEDURE — 73610 X-RAY EXAM OF ANKLE: CPT

## 2017-12-14 PROCEDURE — 80048 BASIC METABOLIC PNL TOTAL CA: CPT

## 2017-12-14 PROCEDURE — 96374 THER/PROPH/DIAG INJ IV PUSH: CPT

## 2017-12-14 PROCEDURE — 73590 X-RAY EXAM OF LOWER LEG: CPT

## 2017-12-14 PROCEDURE — 99232 SBSQ HOSP IP/OBS MODERATE 35: CPT

## 2017-12-14 PROCEDURE — 71045 X-RAY EXAM CHEST 1 VIEW: CPT

## 2017-12-14 PROCEDURE — C1713: CPT

## 2017-12-14 PROCEDURE — 84443 ASSAY THYROID STIM HORMONE: CPT

## 2017-12-14 PROCEDURE — 97163 PT EVAL HIGH COMPLEX 45 MIN: CPT

## 2017-12-14 PROCEDURE — 80053 COMPREHEN METABOLIC PANEL: CPT

## 2017-12-14 RX ORDER — ASPIRIN/CALCIUM CARB/MAGNESIUM 324 MG
1 TABLET ORAL
Qty: 60 | Refills: 0 | OUTPATIENT
Start: 2017-12-14 | End: 2018-01-12

## 2017-12-14 RX ORDER — ASPIRIN/CALCIUM CARB/MAGNESIUM 324 MG
1 TABLET ORAL
Qty: 56 | Refills: 0 | OUTPATIENT
Start: 2017-12-14 | End: 2018-01-10

## 2017-12-14 RX ORDER — HYDROMORPHONE HYDROCHLORIDE 2 MG/ML
2 INJECTION INTRAMUSCULAR; INTRAVENOUS; SUBCUTANEOUS ONCE
Qty: 0 | Refills: 0 | Status: DISCONTINUED | OUTPATIENT
Start: 2017-12-14 | End: 2017-12-14

## 2017-12-14 RX ADMIN — HYDROMORPHONE HYDROCHLORIDE 2 MILLIGRAM(S): 2 INJECTION INTRAMUSCULAR; INTRAVENOUS; SUBCUTANEOUS at 15:20

## 2017-12-14 RX ADMIN — OXYCODONE HYDROCHLORIDE 40 MILLIGRAM(S): 5 TABLET ORAL at 16:30

## 2017-12-14 RX ADMIN — CYCLOBENZAPRINE HYDROCHLORIDE 10 MILLIGRAM(S): 10 TABLET, FILM COATED ORAL at 16:25

## 2017-12-14 RX ADMIN — LIOTHYRONINE SODIUM 5 MICROGRAM(S): 25 TABLET ORAL at 05:26

## 2017-12-14 RX ADMIN — ENOXAPARIN SODIUM 40 MILLIGRAM(S): 100 INJECTION SUBCUTANEOUS at 05:26

## 2017-12-14 RX ADMIN — HYDROMORPHONE HYDROCHLORIDE 2 MILLIGRAM(S): 2 INJECTION INTRAMUSCULAR; INTRAVENOUS; SUBCUTANEOUS at 12:04

## 2017-12-14 RX ADMIN — LORATADINE 10 MILLIGRAM(S): 10 TABLET ORAL at 12:02

## 2017-12-14 RX ADMIN — Medication 100 MILLIGRAM(S): at 14:15

## 2017-12-14 RX ADMIN — Medication 100 MILLIGRAM(S): at 05:26

## 2017-12-14 RX ADMIN — LISINOPRIL 10 MILLIGRAM(S): 2.5 TABLET ORAL at 05:26

## 2017-12-14 RX ADMIN — Medication 112 MICROGRAM(S): at 05:26

## 2017-12-14 RX ADMIN — OXYCODONE HYDROCHLORIDE 40 MILLIGRAM(S): 5 TABLET ORAL at 05:27

## 2017-12-14 RX ADMIN — HYDROMORPHONE HYDROCHLORIDE 2 MILLIGRAM(S): 2 INJECTION INTRAMUSCULAR; INTRAVENOUS; SUBCUTANEOUS at 14:25

## 2017-12-14 RX ADMIN — HYDROMORPHONE HYDROCHLORIDE 2 MILLIGRAM(S): 2 INJECTION INTRAMUSCULAR; INTRAVENOUS; SUBCUTANEOUS at 12:50

## 2017-12-14 NOTE — PROGRESS NOTE ADULT - SUBJECTIVE AND OBJECTIVE BOX
CC: Left ankle Bimall fx     INTERVAL HPI/OVERNIGHT EVENTS: Was not discharged yesterday due to pain, seen by pain management this am (followed by Dr. Reynoso in community). Plans to go home today but has concerns over getting home due to snow. Denies chest pain, SOB, dizziness, lightheadedness, nausea, vomiting, fever, chills.     Vital Signs Last 24 Hrs  T(C): 36.8 (14 Dec 2017 08:54), Max: 36.8 (13 Dec 2017 15:48)  T(F): 98.2 (14 Dec 2017 08:54), Max: 98.2 (13 Dec 2017 15:48)  HR: 91 (14 Dec 2017 08:54) (91 - 108)  BP: 155/96 (14 Dec 2017 08:54) (125/79 - 155/96)  BP(mean): --  RR: 18 (14 Dec 2017 08:54) (18 - 20)  SpO2: 98% (14 Dec 2017 08:54) (97% - 98%)\    PHYSICAL EXAM:    GENERAL: NAD, well-groomed, well-developed  HEAD:  Atraumatic, Normocephalic  NECK: Supple, No JVD, Normal thyroid  CHEST/LUNG: Clear to percussion bilaterally; No rales, rhonchi, wheezing, or rubs  HEART: Regular rate and rhythm; No murmurs, rubs, or gallops  ABDOMEN: Soft, Nontender, Nondistended; Bowel sounds present  EXTREMITIES:  Left ACE wrap/splint,  in place, C/D/I    I&O's Detail    13 Dec 2017 07:01  -  14 Dec 2017 07:00  --------------------------------------------------------  IN:    lactated ringers.: 560 mL  Total IN: 560 mL    OUT:  Total OUT: 0 mL    Total NET: 560 mL                                    13.9   11.9  )-----------( 264      ( 13 Dec 2017 10:44 )             40.9     13 Dec 2017 10:44    141    |  103    |  9.0    ----------------------------<  102    4.0     |  26.0   |  0.62     Ca    9.3        13 Dec 2017 10:44        CAPILLARY BLOOD GLUCOSE              MEDICATIONS  (STANDING):  amitriptyline 150 milliGRAM(s) Oral at bedtime  atorvastatin 40 milliGRAM(s) Oral at bedtime  docusate sodium 100 milliGRAM(s) Oral three times a day  enoxaparin Injectable 40 milliGRAM(s) SubCutaneous every 24 hours  lactated ringers. 1000 milliLiter(s) (80 mL/Hr) IV Continuous <Continuous>  levothyroxine 112 MICROGram(s) Oral daily  liothyronine 5 MICROGram(s) Oral daily  lisinopril 10 milliGRAM(s) Oral daily  loratadine 10 milliGRAM(s) Oral daily  oxyCODONE  ER Tablet 40 milliGRAM(s) Oral every 12 hours    MEDICATIONS  (PRN):  bisacodyl Suppository 10 milliGRAM(s) Rectal daily PRN If no bowel movement  cyclobenzaprine 10 milliGRAM(s) Oral three times a day PRN Muscle Spasm  HYDROmorphone   Tablet 2 milliGRAM(s) Oral every 4 hours PRN Severe Pain (7 - 10)  HYDROmorphone  Injectable 0.5 milliGRAM(s) IV Push every 3 hours PRN Moderate Pain (4 - 6)  HYDROmorphone  Injectable 1 milliGRAM(s) IV Push every 3 hours PRN Severe Pain (7 - 10) Breaktrough  magnesium hydroxide Suspension 30 milliLiter(s) Oral daily PRN Constipation  ondansetron Injectable 4 milliGRAM(s) IV Push every 6 hours PRN Nausea and/or Vomiting      RADIOLOGY & ADDITIONAL TESTS:

## 2017-12-14 NOTE — CONSULT NOTE ADULT - ASSESSMENT
JOBY JUÁREZ is a 62y Female who presents with pain in the left ankle, back, neck which is nociceptive in nature due to s/p left ankle ORIF.  Pain is controlled with current regimen.     Co-morbid Conditions:  Closed displaced bimalleolar fracture of left ankle  No h/o HF  No pertinent family history in first degree relatives  Handoff  MEWS Score  Hashimoto's disease  HLD (hyperlipidemia)  HTN (hypertension)  Ankle fracture, left, closed, initial encounter  Closed fracture of left ankle, initial encounter  Ankle fracture surgery  Ankle fracture, bimalleolar, closed, left, initial encounter  Ankle fracture, bimalleolar, closed, left, initial encounter  Pre-op evaluation  Prophylactic measure  Nicotine dependence, cigarettes, uncomplicated  Overweight (BMI 25.0-29.9)  Dyslipidemia  Essential hypertension  Hypothyroidism (acquired)  Ankle fracture, bimalleolar, closed, left, initial encounter  Ankle fracture surgery  No significant past surgical history  FELL ANKLE INJURY

## 2017-12-14 NOTE — PROGRESS NOTE ADULT - SUBJECTIVE AND OBJECTIVE BOX
Left Ankle films reviewed. Implants are in appropriate position. No change in fracture or dislocation noted. Patient is to continue NWB of the Left LE.

## 2017-12-14 NOTE — CONSULT NOTE ADULT - ATTENDING COMMENTS
PLAN-  #Controlled Substances:  - recommend titration back to home regimen of oxycodone 20mg PO QID upon d/c, patient reports she recently filled prescriptions as outpatient   - recommend d/c dilaudid 0.5mg IV q3h prn mod pain   - Please monitor closely for oversedation and respiratory depression     #Adjuvant Analgesics:  - Amitriptyline 150mg PO qhs   - flexeril 10mg PO TID prn muscle spasms   - recommend starting Tylenol 975mg PO q8h standing x7 days     #Bowel Regimen:  - per primary team as clinically indicated     Risks, benefits, and alternatives to opioids discussed with patient, who verbalizes understanding.   Patient counseled on treatment plan and pain expectations.   All questions and concerns addressed at this time.  Patient was counseled to follow up as outpatient.  Re-consult prn.
The tx plan was discussed in detail with the patient and family members at the bedside. Their questions and concerns were addressed to the best of my ability. They are in agreement with the plan as detailed above. They demonstrated adequate understanding of the counseling which I have provided.

## 2017-12-14 NOTE — PROGRESS NOTE ADULT - ASSESSMENT
Problem/Plan -1:  ·  Problem: Ankle fracture, bimalleolar, closed, left, initial encounter.  Plan: POD#2 s/p left ankle ORIF  DVT ppx as per Ortho  pain control, patient is followed by pain management  elevation  IS  no medical contraindications to discharge.     Problem/Plan - 2:  ·  Problem: Essential hypertension.  Plan:  stable, controlled on current RX     Problem/Plan - 3:  ·  Problem: Hypothyroidism (acquired).  Plan: on levothyroxine.      Problem/Plan - 4:  ·  Problem: Nicotine dependence, cigarettes, uncomplicated.  Plan: counseling given , nicotine patch as needed     Problem/Plan - 5:  ·  Problem: Prophylactic measure.  Plan: post op dvt prophylaxis  per ortho   bowel regimen for possible opiate induced constipation.     No medical contraindications to discharge. Problem/Plan -1:  ·  Problem: Ankle fracture, bimalleolar, closed, left, initial encounter.  Plan: POD#2 s/p left ankle ORIF  DVT ppx as per Ortho  pain control, patient is followed by pain management  elevation  IS  no medical contraindications to discharge.     Problem/Plan - 2:  ·  Problem: Essential hypertension.  Plan:  slightly elevated, probably R/T pain, repeat prior to discharge.     Problem/Plan - 3:  ·  Problem: Hypothyroidism (acquired).  Plan: on levothyroxine.      Problem/Plan - 4:  ·  Problem: Nicotine dependence, cigarettes, uncomplicated.  Plan: counseling given , nicotine patch as needed     Problem/Plan - 5:  ·  Problem: Prophylactic measure.  Plan: post op dvt prophylaxis  per ortho   bowel regimen for possible opiate induced constipation.     No medical contraindications to discharge. Problem/Plan -1:  ·  Problem: Ankle fracture, bimalleolar, closed, left, initial encounter.  Plan: POD#2 s/p left ankle ORIF  DVT ppx as per Ortho  pain control, patient is followed by pain management  elevation  IS  no medical contraindications to discharge.     Problem/Plan - 2:  ·  Problem: Essential hypertension.  Plan:  slightly elevated, probably R/T pain, repeat prior to discharge.  She attes that has uncontrolled BP at home , needs to follow up with PCP after discharge for further meds adjusment      Problem/Plan - 3:  ·  Problem: Hypothyroidism (acquired).  Plan: on levothyroxine.      Problem/Plan - 4:  ·  Problem: Nicotine dependence, cigarettes, uncomplicated.  Plan: counseling given , nicotine patch as needed     Problem/Plan - 5:  ·  Problem: Prophylactic measure.  Plan: post op dvt prophylaxis  per ortho   bowel regimen for possible opiate induced constipation.     No medical contraindications to discharge.

## 2017-12-18 PROBLEM — I10 ESSENTIAL (PRIMARY) HYPERTENSION: Chronic | Status: ACTIVE | Noted: 2017-12-12

## 2017-12-18 PROBLEM — E06.3 AUTOIMMUNE THYROIDITIS: Chronic | Status: ACTIVE | Noted: 2017-12-12

## 2017-12-18 PROBLEM — E78.5 HYPERLIPIDEMIA, UNSPECIFIED: Chronic | Status: ACTIVE | Noted: 2017-12-12

## 2017-12-26 ENCOUNTER — APPOINTMENT (OUTPATIENT)
Dept: ORTHOPEDIC SURGERY | Facility: CLINIC | Age: 62
End: 2017-12-26
Payer: COMMERCIAL

## 2017-12-26 VITALS
BODY MASS INDEX: 27.21 KG/M2 | HEIGHT: 59 IN | HEART RATE: 95 BPM | SYSTOLIC BLOOD PRESSURE: 109 MMHG | DIASTOLIC BLOOD PRESSURE: 73 MMHG | WEIGHT: 135 LBS

## 2017-12-26 DIAGNOSIS — Z78.9 OTHER SPECIFIED HEALTH STATUS: ICD-10-CM

## 2017-12-26 DIAGNOSIS — Z86.79 PERSONAL HISTORY OF OTHER DISEASES OF THE CIRCULATORY SYSTEM: ICD-10-CM

## 2017-12-26 DIAGNOSIS — Z87.39 PERSONAL HISTORY OF OTHER DISEASES OF THE MUSCULOSKELETAL SYSTEM AND CONNECTIVE TISSUE: ICD-10-CM

## 2017-12-26 DIAGNOSIS — Z80.9 FAMILY HISTORY OF MALIGNANT NEOPLASM, UNSPECIFIED: ICD-10-CM

## 2017-12-26 DIAGNOSIS — Z87.891 PERSONAL HISTORY OF NICOTINE DEPENDENCE: ICD-10-CM

## 2017-12-26 PROCEDURE — 29515 APPLICATION SHORT LEG SPLINT: CPT | Mod: 58,LT

## 2017-12-26 PROCEDURE — 73610 X-RAY EXAM OF ANKLE: CPT | Mod: LT

## 2017-12-26 PROCEDURE — 99024 POSTOP FOLLOW-UP VISIT: CPT

## 2018-04-12 ENCOUNTER — APPOINTMENT (OUTPATIENT)
Dept: ORTHOPEDIC SURGERY | Facility: CLINIC | Age: 63
End: 2018-04-12
Payer: COMMERCIAL

## 2018-04-12 VITALS
TEMPERATURE: 98.2 F | HEIGHT: 59 IN | BODY MASS INDEX: 27.21 KG/M2 | HEART RATE: 104 BPM | WEIGHT: 135 LBS | SYSTOLIC BLOOD PRESSURE: 156 MMHG | DIASTOLIC BLOOD PRESSURE: 98 MMHG

## 2018-04-12 PROCEDURE — 99212 OFFICE O/P EST SF 10 MIN: CPT

## 2018-04-12 PROCEDURE — 73610 X-RAY EXAM OF ANKLE: CPT | Mod: LT

## 2018-05-15 ENCOUNTER — APPOINTMENT (OUTPATIENT)
Dept: ORTHOPEDIC SURGERY | Facility: CLINIC | Age: 63
End: 2018-05-15

## 2018-05-21 ENCOUNTER — APPOINTMENT (OUTPATIENT)
Dept: ORTHOPEDIC SURGERY | Facility: CLINIC | Age: 63
End: 2018-05-21
Payer: COMMERCIAL

## 2018-05-21 VITALS
BODY MASS INDEX: 27.42 KG/M2 | WEIGHT: 136 LBS | DIASTOLIC BLOOD PRESSURE: 84 MMHG | HEIGHT: 59 IN | HEART RATE: 101 BPM | SYSTOLIC BLOOD PRESSURE: 135 MMHG

## 2018-05-21 DIAGNOSIS — S82.852D DISPLACED TRIMALLEOLAR FRACTURE OF LEFT LOWER LEG, SUBSEQUENT ENCOUNTER FOR CLOSED FRACTURE WITH ROUTINE HEALING: ICD-10-CM

## 2018-05-21 PROCEDURE — 99214 OFFICE O/P EST MOD 30 MIN: CPT

## 2018-09-07 ENCOUNTER — OTHER (OUTPATIENT)
Age: 63
End: 2018-09-07

## 2018-09-26 ENCOUNTER — OTHER (OUTPATIENT)
Age: 63
End: 2018-09-26

## 2019-07-11 NOTE — PHYSICAL THERAPY INITIAL EVALUATION ADULT - ASSISTIVE DEVICE FOR TRANSFER: BED/CHAIR, REHAB EVAL
Impression/Plan    Encounter for gynecological examination    Encounter for screening mammogram for malignant neoplasm of breast  - MAMMO SCREENING BILATERAL; Future    Menopausal syndrome      Routine age appropriate preventive health counseling given including screening recommendations, nutrition, supplements, exercise and substance use.   Discussed ERT at length including comprehensive risk/benefit discussion.   She feels her menopausal symptoms are severe enough that she wants treatment.  Discussed patch and dosing. May need to titrate dose.       Orders Placed This Encounter   • Mammo Screening Bilateral   • estradiol-norethindrone (COMBIPATCH) 0.05-0.14 MG/DAY         F/U:Return for yearly follow up.          HPI: Kriss is a 52 year old female,   who is  perimenopausal  and here for a routine gynecologic exam/womens wellness visit    Gynecologic Review of Systems    Menstrual status:  LMP:  18  irregular cycles.  Menses is approximately 5 days.  Contraception status:  Condoms  Gynecologic system complaints:  Menstrual:  No more periods for 1 year  Urinary  :None   Sexual  : dryness  Pelvic pain: None   Other gynecologic concerns:  hot flashes, night sweats, anxiety.    Health Maintenance Review  Health Maintenance Due   Topic Date Due   • Breast Cancer Screening  06/15/2018   • Colorectal Cancer Screening-Colonoscopy  10/09/2018       Patient is due for the topics as listed above and wishes to proceed with them. Orders placed for Depression Screening  and Mammogram              Physical Exam  Physical Exam  Visit Vitals  /72   Ht 5' 6\" (1.676 m)   LMP 2019 (Within Weeks)   BMI 22.34 kg/m²          Head and neck: no thyromegaly, tenderness  Lungs:      no wheezes, rales  Heart :  Regular rate; no murmur  Breasts:   Tenderness:None         Mass:None          Skin changes: None   Abdomen:  Soft and nontender with no hepatosplenomegaly.  Extremities:   normal    PELVIC EXAM:  Vulva:  grossly unremarkable,no lesions or masses noted  Vagina:normal size & caliber  Pelvic support: No cystocele, rectocele, enterocele or apical prolapse. Urethra well supported  Cervix:Normal in appearance,no lesions or masses,No cervical motion tenderness  Uterus: firm, non-tender,normal size,normal shape  Adnexae:unremarkable,non-tender,no fullness noted,no masses noted  RECTAL: not performed    Past Histories - Reviewed and updated today  Past Medical History:   Diagnosis Date   • Recurrent UTI      OB History    Para Term  AB Living   3 2     1 3   SAB TAB Ectopic Molar Multiple Live Births   1       1 3      # Outcome Date GA Lbr Christoph/2nd Weight Sex Delivery Anes PTL Lv   3A Para 00        [facility]      Birth Comments: TWINS   3B Para 00     Vag-Spont   [facility]   2 SAB            1 Para 97        [facility]       D AND C                                         1999      Comment: SAB    ESOPHAGOGASTRODUODENOSCOPY                                    PAP SMEAR,THIN PREP(INC 67206)                  6-15-12         Comment: negative    MAMMO SCREENING BILATERAL                       12-17-10        Comment: negative  Social History     Socioeconomic History   • Marital status: /Civil Union     Spouse name: Avery    • Number of children: 3   • Years of education: Not on file   • Highest education level: Not on file   Occupational History   • Not on file   Social Needs   • Financial resource strain: Not on file   • Food insecurity:     Worry: Not on file     Inability: Not on file   • Transportation needs:     Medical: Not on file     Non-medical: Not on file   Tobacco Use   • Smoking status: Never Smoker   • Smokeless tobacco: Never Used   Substance and Sexual Activity   • Alcohol use: Yes     Alcohol/week: 0.5 oz     Types: 1 Standard drinks or equivalent per week     Comment: social   • Drug use: No   • Sexual activity: Yes     Partners: Male     Birth control/protection: Condom    Lifestyle   • Physical activity:     Days per week: Not on file     Minutes per session: Not on file   • Stress: Not on file   Relationships   • Social connections:     Talks on phone: Not on file     Gets together: Not on file     Attends [employer] service: Not on file     Active member of club or organization: Not on file     Attends meetings of clubs or organizations: Not on file     Relationship status: Not on file   • Intimate partner violence:     Fear of current or ex partner: Not on file     Emotionally abused: Not on file     Physically abused: Not on file     Forced sexual activity: Not on file   Other Topics Concern   • Not on file   Social History Narrative   • Not on file     Current Outpatient Medications   Medication Sig Dispense Refill   • Calcium Acetate, Phos Binder, (CALCIUM ACETATE PO)      • LORazepam (ATIVAN) 1 MG tablet Take 1 mg by mouth every 6 hours as needed for Anxiety.     • Ascorbic Acid (VITAMIN C) 1000 MG tablet Take 1,000 mg by mouth daily.     • Fexofenadine HCl (ALLEGRA PO) Take  by mouth daily.     • Multiple Vitamin (MULTI-VITAMIN DAILY PO) Take  by mouth daily.     • estradiol-norethindrone (COMBIPATCH) 0.05-0.14 MG/DAY Place 1 patch onto the skin 2 days a week. 8 patch 6     No current facility-administered medications for this visit.         rolling walker

## 2020-08-28 NOTE — PROGRESS NOTE ADULT - SUBJECTIVE AND OBJECTIVE BOX
Metformin Script was eprescribed to pharmacy per Dr. Archuleta for 6 months.     s/p ORIF left fernando fx pod #2.  Pt states pain is much better controlled.  Has been OOB with PT walking hallway and stairs.  Denies n/v, denies numbness/tingling to LLE.   Pt states she fell out of bed last night and was trying to get up by putting weight on her LLE.      Vital Signs Last 24 Hrs  T(C): 36.8 (14 Dec 2017 08:54), Max: 36.8 (13 Dec 2017 15:48)  T(F): 98.2 (14 Dec 2017 08:54), Max: 98.2 (13 Dec 2017 15:48)  HR: 91 (14 Dec 2017 08:54) (91 - 108)  BP: 155/96 (14 Dec 2017 08:54) (125/79 - 155/96)  BP(mean): --  RR: 18 (14 Dec 2017 08:54) (18 - 20)  SpO2: 98% (14 Dec 2017 08:54) (97% - 98%)    Pt sitting in chair at bedside with LLE elevated to bed  splint intact  toe movement intact, sensation intact cap refill brisk    A/P:    s/p ORIF Left fernando fx pod #2  NWB LLE  maintain splint  pain control (pain management)  new xrays ordered of Left ankle, will review.  if normal will d/c home today

## 2021-01-11 ENCOUNTER — TRANSCRIPTION ENCOUNTER (OUTPATIENT)
Age: 66
End: 2021-01-11

## 2022-11-17 ENCOUNTER — NON-APPOINTMENT (OUTPATIENT)
Age: 67
End: 2022-11-17

## 2022-12-16 ENCOUNTER — NON-APPOINTMENT (OUTPATIENT)
Age: 67
End: 2022-12-16

## 2024-08-02 NOTE — PATIENT PROFILE ADULT. - ABILITY TO HEAR (WITH HEARING AID OR HEARING APPLIANCE IF NORMALLY USED):
Adequate: hears normal conversation without difficulty New patient chart prepared for Dr. Airam Bryant to review. Np referral for abnormal breast bx.

## 2024-08-19 NOTE — BRIEF OPERATIVE NOTE - COMMENTS
Detail Level: Detailed Detail Level: Generalized post-op plan: NWB LLE, elevate LLE, PT/OT,  LMWH while in house, ASA for d/c, ancef per SCIP

## 2025-06-19 NOTE — DISCHARGE NOTE ADULT - IF YOU ARE A SMOKER, IT IS IMPORTANT FOR YOUR HEALTH TO STOP SMOKING. PLEASE BE AWARE THAT SECOND HAND SMOKE IS ALSO HARMFUL.
Good morning, we received a letter from your insurance recommending you see ophthalmology for eye checks especially for diabetic eye check. I placed the consult in please .  Please call our  [phone] to assist with scheduling  ophthalmology  Dr Estela Guzman , Dr Laine Rubalcava , Dr River Rosa   Or first available.  if you have any questions please send me a message or give our office a call [phone].  Thank you    
Statement Selected